# Patient Record
Sex: FEMALE | Race: WHITE | NOT HISPANIC OR LATINO | Employment: OTHER | URBAN - METROPOLITAN AREA
[De-identification: names, ages, dates, MRNs, and addresses within clinical notes are randomized per-mention and may not be internally consistent; named-entity substitution may affect disease eponyms.]

---

## 2017-02-14 ENCOUNTER — COMMUNICATION - HEALTHEAST (OUTPATIENT)
Dept: INTERNAL MEDICINE | Facility: CLINIC | Age: 67
End: 2017-02-14

## 2017-02-14 DIAGNOSIS — E78.5 HYPERLIPIDEMIA: ICD-10-CM

## 2017-03-22 ENCOUNTER — COMMUNICATION - HEALTHEAST (OUTPATIENT)
Dept: INTERNAL MEDICINE | Facility: CLINIC | Age: 67
End: 2017-03-22

## 2017-03-22 DIAGNOSIS — F41.9 ANXIETY: ICD-10-CM

## 2017-05-05 ENCOUNTER — OFFICE VISIT - HEALTHEAST (OUTPATIENT)
Dept: INTERNAL MEDICINE | Facility: CLINIC | Age: 67
End: 2017-05-05

## 2017-05-05 DIAGNOSIS — L98.9 SKIN DISORDER: ICD-10-CM

## 2017-05-05 DIAGNOSIS — Z00.00 HEALTH CARE MAINTENANCE: ICD-10-CM

## 2017-05-05 DIAGNOSIS — Z12.31 OTHER SCREENING MAMMOGRAM: ICD-10-CM

## 2017-05-05 DIAGNOSIS — E78.5 HYPERLIPEMIA: ICD-10-CM

## 2017-05-05 ASSESSMENT — MIFFLIN-ST. JEOR: SCORE: 1176.34

## 2017-05-23 ENCOUNTER — COMMUNICATION - HEALTHEAST (OUTPATIENT)
Dept: INTERNAL MEDICINE | Facility: CLINIC | Age: 67
End: 2017-05-23

## 2017-05-23 DIAGNOSIS — E78.5 HYPERLIPIDEMIA: ICD-10-CM

## 2017-05-24 ENCOUNTER — COMMUNICATION - HEALTHEAST (OUTPATIENT)
Dept: ADMINISTRATIVE | Facility: CLINIC | Age: 67
End: 2017-05-24

## 2017-06-02 ENCOUNTER — AMBULATORY - HEALTHEAST (OUTPATIENT)
Dept: LAB | Facility: CLINIC | Age: 67
End: 2017-06-02

## 2017-06-02 ENCOUNTER — HOSPITAL ENCOUNTER (OUTPATIENT)
Dept: MAMMOGRAPHY | Facility: CLINIC | Age: 67
Discharge: HOME | End: 2017-06-02
Attending: INTERNAL MEDICINE

## 2017-06-02 DIAGNOSIS — Z12.31 OTHER SCREENING MAMMOGRAM: ICD-10-CM

## 2017-06-02 DIAGNOSIS — E78.5 HYPERLIPEMIA: ICD-10-CM

## 2017-06-02 DIAGNOSIS — Z00.00 HEALTH CARE MAINTENANCE: ICD-10-CM

## 2017-06-02 LAB
CHOLEST SERPL-MCNC: 235 MG/DL
FASTING STATUS PATIENT QL REPORTED: YES
HDLC SERPL-MCNC: 98 MG/DL
LDLC SERPL CALC-MCNC: 122 MG/DL
TRIGL SERPL-MCNC: 74 MG/DL

## 2017-08-10 ENCOUNTER — COMMUNICATION - HEALTHEAST (OUTPATIENT)
Dept: INTERNAL MEDICINE | Facility: CLINIC | Age: 67
End: 2017-08-10

## 2017-08-10 DIAGNOSIS — F41.9 ANXIETY: ICD-10-CM

## 2018-05-04 ENCOUNTER — COMMUNICATION - HEALTHEAST (OUTPATIENT)
Dept: INTERNAL MEDICINE | Facility: CLINIC | Age: 68
End: 2018-05-04

## 2018-05-04 DIAGNOSIS — F41.9 ANXIETY: ICD-10-CM

## 2018-05-18 ENCOUNTER — COMMUNICATION - HEALTHEAST (OUTPATIENT)
Dept: INTERNAL MEDICINE | Facility: CLINIC | Age: 68
End: 2018-05-18

## 2018-05-18 DIAGNOSIS — E78.5 HYPERLIPIDEMIA: ICD-10-CM

## 2018-09-04 ENCOUNTER — OFFICE VISIT - HEALTHEAST (OUTPATIENT)
Dept: FAMILY MEDICINE | Facility: CLINIC | Age: 68
End: 2018-09-04

## 2018-09-04 DIAGNOSIS — S61.211A LACERATION OF LEFT INDEX FINGER WITHOUT FOREIGN BODY WITHOUT DAMAGE TO NAIL, INITIAL ENCOUNTER: ICD-10-CM

## 2018-09-05 ENCOUNTER — OFFICE VISIT - HEALTHEAST (OUTPATIENT)
Dept: FAMILY MEDICINE | Facility: CLINIC | Age: 68
End: 2018-09-05

## 2018-09-05 DIAGNOSIS — R35.0 URINARY FREQUENCY: ICD-10-CM

## 2018-09-05 DIAGNOSIS — N39.0 URINARY TRACT INFECTION: ICD-10-CM

## 2018-09-05 LAB
ALBUMIN UR-MCNC: NEGATIVE MG/DL
APPEARANCE UR: ABNORMAL
BACTERIA #/AREA URNS HPF: ABNORMAL HPF
BILIRUB UR QL STRIP: NEGATIVE
COLOR UR AUTO: YELLOW
GLUCOSE UR STRIP-MCNC: NEGATIVE MG/DL
HGB UR QL STRIP: ABNORMAL
KETONES UR STRIP-MCNC: NEGATIVE MG/DL
LEUKOCYTE ESTERASE UR QL STRIP: ABNORMAL
NITRATE UR QL: POSITIVE
PH UR STRIP: 7 [PH] (ref 5–8)
RBC #/AREA URNS AUTO: ABNORMAL HPF
SP GR UR STRIP: 1.01 (ref 1–1.03)
SQUAMOUS #/AREA URNS AUTO: ABNORMAL LPF
UROBILINOGEN UR STRIP-ACNC: ABNORMAL
WBC #/AREA URNS AUTO: ABNORMAL HPF

## 2018-09-07 LAB — BACTERIA SPEC CULT: ABNORMAL

## 2018-09-11 ENCOUNTER — OFFICE VISIT - HEALTHEAST (OUTPATIENT)
Dept: INTERNAL MEDICINE | Facility: CLINIC | Age: 68
End: 2018-09-11

## 2018-09-11 DIAGNOSIS — Z48.02 VISIT FOR SUTURE REMOVAL: ICD-10-CM

## 2018-09-14 ENCOUNTER — OFFICE VISIT - HEALTHEAST (OUTPATIENT)
Dept: INTERNAL MEDICINE | Facility: CLINIC | Age: 68
End: 2018-09-14

## 2018-09-14 DIAGNOSIS — Z48.02 ENCOUNTER FOR REMOVAL OF SUTURES: ICD-10-CM

## 2018-09-14 DIAGNOSIS — M35.00 SICCA SYNDROME (H): ICD-10-CM

## 2018-11-07 ENCOUNTER — COMMUNICATION - HEALTHEAST (OUTPATIENT)
Dept: INTERNAL MEDICINE | Facility: CLINIC | Age: 68
End: 2018-11-07

## 2018-11-07 DIAGNOSIS — F41.9 ANXIETY: ICD-10-CM

## 2018-11-13 ENCOUNTER — OFFICE VISIT - HEALTHEAST (OUTPATIENT)
Dept: INTERNAL MEDICINE | Facility: CLINIC | Age: 68
End: 2018-11-13

## 2018-11-13 DIAGNOSIS — Z00.00 ROUTINE HEALTH MAINTENANCE: ICD-10-CM

## 2018-11-13 DIAGNOSIS — Z00.00 ROUTINE GENERAL MEDICAL EXAMINATION AT A HEALTH CARE FACILITY: ICD-10-CM

## 2018-11-13 DIAGNOSIS — F41.9 ANXIETY: ICD-10-CM

## 2018-11-13 DIAGNOSIS — M94.9 DISORDER OF BONE AND CARTILAGE: ICD-10-CM

## 2018-11-13 DIAGNOSIS — M89.9 DISORDER OF BONE AND CARTILAGE: ICD-10-CM

## 2018-11-13 DIAGNOSIS — H90.6 MIXED HEARING LOSS, BILATERAL: ICD-10-CM

## 2018-11-13 DIAGNOSIS — Z12.31 ENCOUNTER FOR SCREENING MAMMOGRAM FOR MALIGNANT NEOPLASM OF BREAST: ICD-10-CM

## 2018-11-13 DIAGNOSIS — M35.00 SICCA SYNDROME (H): ICD-10-CM

## 2018-11-13 DIAGNOSIS — E78.5 HYPERLIPEMIA: ICD-10-CM

## 2018-11-13 ASSESSMENT — MIFFLIN-ST. JEOR: SCORE: 1195.16

## 2018-11-28 ENCOUNTER — AMBULATORY - HEALTHEAST (OUTPATIENT)
Dept: LAB | Facility: CLINIC | Age: 68
End: 2018-11-28

## 2018-11-28 DIAGNOSIS — M89.9 DISORDER OF BONE AND CARTILAGE: ICD-10-CM

## 2018-11-28 DIAGNOSIS — M94.9 DISORDER OF BONE AND CARTILAGE: ICD-10-CM

## 2018-11-28 DIAGNOSIS — Z00.00 ROUTINE HEALTH MAINTENANCE: ICD-10-CM

## 2018-11-28 DIAGNOSIS — E78.5 HYPERLIPEMIA: ICD-10-CM

## 2018-11-28 DIAGNOSIS — R82.90 ABNORMAL URINE: ICD-10-CM

## 2018-11-28 LAB
ALBUMIN SERPL-MCNC: 3.9 G/DL (ref 3.5–5)
ALBUMIN UR-MCNC: NEGATIVE MG/DL
ALP SERPL-CCNC: 97 U/L (ref 45–120)
ALT SERPL W P-5'-P-CCNC: 14 U/L (ref 0–45)
ANION GAP SERPL CALCULATED.3IONS-SCNC: 10 MMOL/L (ref 5–18)
APPEARANCE UR: CLEAR
AST SERPL W P-5'-P-CCNC: 18 U/L (ref 0–40)
BACTERIA #/AREA URNS HPF: ABNORMAL HPF
BILIRUB SERPL-MCNC: 0.7 MG/DL (ref 0–1)
BILIRUB UR QL STRIP: NEGATIVE
BUN SERPL-MCNC: 13 MG/DL (ref 8–22)
CALCIUM SERPL-MCNC: 9.9 MG/DL (ref 8.5–10.5)
CHLORIDE BLD-SCNC: 104 MMOL/L (ref 98–107)
CHOLEST SERPL-MCNC: 254 MG/DL
CO2 SERPL-SCNC: 27 MMOL/L (ref 22–31)
COLOR UR AUTO: YELLOW
CREAT SERPL-MCNC: 0.83 MG/DL (ref 0.6–1.1)
ERYTHROCYTE [DISTWIDTH] IN BLOOD BY AUTOMATED COUNT: 12.3 % (ref 11–14.5)
FASTING STATUS PATIENT QL REPORTED: YES
GFR SERPL CREATININE-BSD FRML MDRD: >60 ML/MIN/1.73M2
GLUCOSE BLD-MCNC: 100 MG/DL (ref 70–125)
GLUCOSE UR STRIP-MCNC: NEGATIVE MG/DL
HCT VFR BLD AUTO: 39.2 % (ref 35–47)
HDLC SERPL-MCNC: 93 MG/DL
HGB BLD-MCNC: 13.2 G/DL (ref 12–16)
HGB UR QL STRIP: ABNORMAL
KETONES UR STRIP-MCNC: NEGATIVE MG/DL
LDLC SERPL CALC-MCNC: 137 MG/DL
LEUKOCYTE ESTERASE UR QL STRIP: ABNORMAL
MCH RBC QN AUTO: 30.6 PG (ref 27–34)
MCHC RBC AUTO-ENTMCNC: 33.7 G/DL (ref 32–36)
MCV RBC AUTO: 91 FL (ref 80–100)
NITRATE UR QL: NEGATIVE
PH UR STRIP: 7 [PH] (ref 5–8)
PLATELET # BLD AUTO: 203 THOU/UL (ref 140–440)
PMV BLD AUTO: 7.2 FL (ref 7–10)
POTASSIUM BLD-SCNC: 3.9 MMOL/L (ref 3.5–5)
PROT SERPL-MCNC: 6.9 G/DL (ref 6–8)
RBC # BLD AUTO: 4.31 MILL/UL (ref 3.8–5.4)
RBC #/AREA URNS AUTO: ABNORMAL HPF
SODIUM SERPL-SCNC: 141 MMOL/L (ref 136–145)
SP GR UR STRIP: 1.02 (ref 1–1.03)
SQUAMOUS #/AREA URNS AUTO: ABNORMAL LPF
TRIGL SERPL-MCNC: 119 MG/DL
UROBILINOGEN UR STRIP-ACNC: ABNORMAL
WBC #/AREA URNS AUTO: ABNORMAL HPF
WBC: 5 THOU/UL (ref 4–11)

## 2018-11-29 LAB
25(OH)D3 SERPL-MCNC: 31.7 NG/ML (ref 30–80)
25(OH)D3 SERPL-MCNC: 31.7 NG/ML (ref 30–80)
BACTERIA SPEC CULT: NO GROWTH

## 2018-12-03 ENCOUNTER — TRANSFERRED RECORDS (OUTPATIENT)
Dept: HEALTH INFORMATION MANAGEMENT | Facility: CLINIC | Age: 68
End: 2018-12-03

## 2018-12-04 ENCOUNTER — OFFICE VISIT - HEALTHEAST (OUTPATIENT)
Dept: AUDIOLOGY | Facility: CLINIC | Age: 68
End: 2018-12-04

## 2018-12-04 DIAGNOSIS — H61.23 BILATERAL IMPACTED CERUMEN: ICD-10-CM

## 2018-12-12 ENCOUNTER — OFFICE VISIT - HEALTHEAST (OUTPATIENT)
Dept: OTOLARYNGOLOGY | Facility: CLINIC | Age: 68
End: 2018-12-12

## 2018-12-12 DIAGNOSIS — H61.23 BILATERAL IMPACTED CERUMEN: ICD-10-CM

## 2019-02-06 ENCOUNTER — HOSPITAL ENCOUNTER (OUTPATIENT)
Dept: MAMMOGRAPHY | Facility: CLINIC | Age: 69
Discharge: HOME | End: 2019-02-06
Attending: INTERNAL MEDICINE

## 2019-02-06 DIAGNOSIS — Z00.00 ROUTINE HEALTH MAINTENANCE: ICD-10-CM

## 2019-02-06 DIAGNOSIS — Z12.31 ENCOUNTER FOR SCREENING MAMMOGRAM FOR MALIGNANT NEOPLASM OF BREAST: ICD-10-CM

## 2019-02-23 ENCOUNTER — COMMUNICATION - HEALTHEAST (OUTPATIENT)
Dept: INTERNAL MEDICINE | Facility: CLINIC | Age: 69
End: 2019-02-23

## 2019-02-23 DIAGNOSIS — E78.5 HYPERLIPIDEMIA: ICD-10-CM

## 2019-04-02 ENCOUNTER — OFFICE VISIT - HEALTHEAST (OUTPATIENT)
Dept: AUDIOLOGY | Facility: CLINIC | Age: 69
End: 2019-04-02

## 2019-04-02 DIAGNOSIS — H91.92 HEARING LOSS OF LEFT EAR, UNSPECIFIED HEARING LOSS TYPE: ICD-10-CM

## 2019-04-02 DIAGNOSIS — H93.12 TINNITUS OF LEFT EAR: ICD-10-CM

## 2019-04-24 ENCOUNTER — OFFICE VISIT - HEALTHEAST (OUTPATIENT)
Dept: FAMILY MEDICINE | Facility: CLINIC | Age: 69
End: 2019-04-24

## 2019-04-24 DIAGNOSIS — R82.90 CLOUDY URINE: ICD-10-CM

## 2019-04-24 DIAGNOSIS — N30.00 ACUTE CYSTITIS WITHOUT HEMATURIA: ICD-10-CM

## 2019-04-24 LAB
ALBUMIN UR-MCNC: NEGATIVE MG/DL
APPEARANCE UR: ABNORMAL
BACTERIA #/AREA URNS HPF: ABNORMAL HPF
BILIRUB UR QL STRIP: NEGATIVE
COLOR UR AUTO: YELLOW
GLUCOSE UR STRIP-MCNC: NEGATIVE MG/DL
HGB UR QL STRIP: ABNORMAL
KETONES UR STRIP-MCNC: NEGATIVE MG/DL
LEUKOCYTE ESTERASE UR QL STRIP: ABNORMAL
NITRATE UR QL: POSITIVE
PH UR STRIP: 8.5 [PH] (ref 5–8)
RBC #/AREA URNS AUTO: ABNORMAL HPF
SP GR UR STRIP: 1.01 (ref 1–1.03)
SQUAMOUS #/AREA URNS AUTO: ABNORMAL LPF
UROBILINOGEN UR STRIP-ACNC: ABNORMAL
WBC #/AREA URNS AUTO: ABNORMAL HPF

## 2019-04-26 LAB — BACTERIA SPEC CULT: ABNORMAL

## 2019-05-09 ENCOUNTER — COMMUNICATION - HEALTHEAST (OUTPATIENT)
Dept: INTERNAL MEDICINE | Facility: CLINIC | Age: 69
End: 2019-05-09

## 2019-05-09 DIAGNOSIS — R31.29 MICROSCOPIC HEMATURIA: ICD-10-CM

## 2019-05-20 ENCOUNTER — OFFICE VISIT - HEALTHEAST (OUTPATIENT)
Dept: FAMILY MEDICINE | Facility: CLINIC | Age: 69
End: 2019-05-20
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
## 2019-05-20 DIAGNOSIS — S30.861A TICK BITE OF ABDOMEN, INITIAL ENCOUNTER: ICD-10-CM

## 2019-06-03 ENCOUNTER — AMBULATORY - HEALTHEAST (OUTPATIENT)
Dept: LAB | Facility: CLINIC | Age: 69
End: 2019-06-03

## 2019-06-03 DIAGNOSIS — R31.29 MICROSCOPIC HEMATURIA: ICD-10-CM

## 2019-06-03 LAB
ALBUMIN UR-MCNC: NEGATIVE MG/DL
APPEARANCE UR: CLEAR
BACTERIA #/AREA URNS HPF: ABNORMAL HPF
BILIRUB UR QL STRIP: NEGATIVE
COLOR UR AUTO: YELLOW
GLUCOSE UR STRIP-MCNC: NEGATIVE MG/DL
HGB UR QL STRIP: ABNORMAL
KETONES UR STRIP-MCNC: NEGATIVE MG/DL
LEUKOCYTE ESTERASE UR QL STRIP: ABNORMAL
NITRATE UR QL: NEGATIVE
PH UR STRIP: 6 [PH] (ref 5–8)
RBC #/AREA URNS AUTO: ABNORMAL HPF
SP GR UR STRIP: <=1.005 (ref 1–1.03)
SQUAMOUS #/AREA URNS AUTO: ABNORMAL LPF
UROBILINOGEN UR STRIP-ACNC: ABNORMAL
WBC #/AREA URNS AUTO: ABNORMAL HPF

## 2019-06-04 ENCOUNTER — COMMUNICATION - HEALTHEAST (OUTPATIENT)
Dept: SCHEDULING | Facility: CLINIC | Age: 69
End: 2019-06-04

## 2019-06-04 ENCOUNTER — OFFICE VISIT - HEALTHEAST (OUTPATIENT)
Dept: FAMILY MEDICINE | Facility: CLINIC | Age: 69
End: 2019-06-04

## 2019-06-04 DIAGNOSIS — Z12.11 SCREEN FOR COLON CANCER: ICD-10-CM

## 2019-06-04 DIAGNOSIS — J06.9 VIRAL URI WITH COUGH: ICD-10-CM

## 2019-06-04 DIAGNOSIS — B97.89 OTHER VIRAL AGENTS AS THE CAUSE OF DISEASES CLASSIFIED ELSEWHERE: ICD-10-CM

## 2019-06-04 DIAGNOSIS — B30.9 ACUTE VIRAL CONJUNCTIVITIS OF BOTH EYES: ICD-10-CM

## 2019-06-05 LAB — BACTERIA SPEC CULT: ABNORMAL

## 2019-06-06 ENCOUNTER — COMMUNICATION - HEALTHEAST (OUTPATIENT)
Dept: INTERNAL MEDICINE | Facility: CLINIC | Age: 69
End: 2019-06-06

## 2019-06-06 DIAGNOSIS — N39.0 URINARY TRACT INFECTION WITHOUT HEMATURIA, SITE UNSPECIFIED: ICD-10-CM

## 2019-08-23 ENCOUNTER — RECORDS - HEALTHEAST (OUTPATIENT)
Dept: ADMINISTRATIVE | Facility: OTHER | Age: 69
End: 2019-08-23

## 2019-11-04 ENCOUNTER — COMMUNICATION - HEALTHEAST (OUTPATIENT)
Dept: INTERNAL MEDICINE | Facility: CLINIC | Age: 69
End: 2019-11-04

## 2019-11-04 DIAGNOSIS — F41.9 ANXIETY: ICD-10-CM

## 2020-02-10 ENCOUNTER — HOSPITAL ENCOUNTER (OUTPATIENT)
Dept: MAMMOGRAPHY | Facility: CLINIC | Age: 70
Discharge: HOME | End: 2020-02-10
Attending: INTERNAL MEDICINE

## 2020-02-10 DIAGNOSIS — Z12.31 SCREENING MAMMOGRAM, ENCOUNTER FOR: ICD-10-CM

## 2020-02-13 ENCOUNTER — HOSPITAL ENCOUNTER (OUTPATIENT)
Dept: MAMMOGRAPHY | Facility: CLINIC | Age: 70
Discharge: HOME | End: 2020-02-13
Attending: INTERNAL MEDICINE

## 2020-02-13 DIAGNOSIS — N64.89 BREAST ASYMMETRY: ICD-10-CM

## 2020-04-07 ENCOUNTER — COMMUNICATION - HEALTHEAST (OUTPATIENT)
Dept: INTERNAL MEDICINE | Facility: CLINIC | Age: 70
End: 2020-04-07

## 2020-04-07 DIAGNOSIS — E78.5 HYPERLIPIDEMIA: ICD-10-CM

## 2020-04-20 ENCOUNTER — COMMUNICATION - HEALTHEAST (OUTPATIENT)
Dept: INTERNAL MEDICINE | Facility: CLINIC | Age: 70
End: 2020-04-20

## 2020-04-21 ENCOUNTER — COMMUNICATION - HEALTHEAST (OUTPATIENT)
Dept: INTERNAL MEDICINE | Facility: CLINIC | Age: 70
End: 2020-04-21

## 2020-04-22 ENCOUNTER — OFFICE VISIT - HEALTHEAST (OUTPATIENT)
Dept: INTERNAL MEDICINE | Facility: CLINIC | Age: 70
End: 2020-04-22

## 2020-04-22 DIAGNOSIS — M79.621 AXILLARY PAIN, RIGHT: ICD-10-CM

## 2020-04-22 DIAGNOSIS — H81.10 BENIGN PAROXYSMAL POSITIONAL VERTIGO, UNSPECIFIED LATERALITY: ICD-10-CM

## 2020-04-22 DIAGNOSIS — E78.5 HYPERLIPIDEMIA, UNSPECIFIED HYPERLIPIDEMIA TYPE: ICD-10-CM

## 2020-04-22 DIAGNOSIS — F41.9 ANXIETY: ICD-10-CM

## 2020-07-02 ENCOUNTER — COMMUNICATION - HEALTHEAST (OUTPATIENT)
Dept: INTERNAL MEDICINE | Facility: CLINIC | Age: 70
End: 2020-07-02

## 2020-07-02 DIAGNOSIS — E78.5 HYPERLIPIDEMIA: ICD-10-CM

## 2020-07-02 DIAGNOSIS — F41.9 ANXIETY: ICD-10-CM

## 2020-10-14 ENCOUNTER — OFFICE VISIT - HEALTHEAST (OUTPATIENT)
Dept: INTERNAL MEDICINE | Facility: CLINIC | Age: 70
End: 2020-10-14

## 2020-10-14 ENCOUNTER — TRANSFERRED RECORDS (OUTPATIENT)
Dept: HEALTH INFORMATION MANAGEMENT | Facility: CLINIC | Age: 70
End: 2020-10-14

## 2020-10-14 DIAGNOSIS — R05.3 CHRONIC COUGH: ICD-10-CM

## 2020-10-14 DIAGNOSIS — Z00.00 ROUTINE GENERAL MEDICAL EXAMINATION AT A HEALTH CARE FACILITY: ICD-10-CM

## 2020-10-14 DIAGNOSIS — M94.9 DISORDER OF BONE AND CARTILAGE: ICD-10-CM

## 2020-10-14 DIAGNOSIS — M35.00 SICCA SYNDROME (H): ICD-10-CM

## 2020-10-14 DIAGNOSIS — R79.89 LOW TSH LEVEL: ICD-10-CM

## 2020-10-14 DIAGNOSIS — F41.9 ANXIETY: ICD-10-CM

## 2020-10-14 DIAGNOSIS — M79.621 AXILLARY PAIN, RIGHT: ICD-10-CM

## 2020-10-14 DIAGNOSIS — M89.9 DISORDER OF BONE AND CARTILAGE: ICD-10-CM

## 2020-10-14 DIAGNOSIS — E78.5 HYPERLIPIDEMIA, UNSPECIFIED HYPERLIPIDEMIA TYPE: ICD-10-CM

## 2020-10-14 LAB
ALBUMIN SERPL-MCNC: 4.1 G/DL (ref 3.5–5)
ALBUMIN UR-MCNC: NEGATIVE MG/DL
ALP SERPL-CCNC: 132 U/L (ref 45–120)
ALT SERPL W P-5'-P-CCNC: 19 U/L (ref 0–45)
ANION GAP SERPL CALCULATED.3IONS-SCNC: 10 MMOL/L (ref 5–18)
APPEARANCE UR: CLEAR
AST SERPL W P-5'-P-CCNC: 18 U/L (ref 0–40)
BACTERIA #/AREA URNS HPF: ABNORMAL HPF
BILIRUB SERPL-MCNC: 0.6 MG/DL (ref 0–1)
BILIRUB UR QL STRIP: NEGATIVE
BUN SERPL-MCNC: 14 MG/DL (ref 8–22)
CALCIUM SERPL-MCNC: 10 MG/DL (ref 8.5–10.5)
CHLORIDE BLD-SCNC: 104 MMOL/L (ref 98–107)
CHOLEST SERPL-MCNC: 217 MG/DL
CO2 SERPL-SCNC: 27 MMOL/L (ref 22–31)
COLOR UR AUTO: YELLOW
CREAT SERPL-MCNC: 0.75 MG/DL (ref 0.6–1.1)
ERYTHROCYTE [DISTWIDTH] IN BLOOD BY AUTOMATED COUNT: 14 % (ref 11–14.5)
FASTING STATUS PATIENT QL REPORTED: YES
GFR SERPL CREATININE-BSD FRML MDRD: >60 ML/MIN/1.73M2
GLUCOSE BLD-MCNC: 98 MG/DL (ref 70–125)
GLUCOSE UR STRIP-MCNC: NEGATIVE MG/DL
HCT VFR BLD AUTO: 41.6 % (ref 35–47)
HDLC SERPL-MCNC: 75 MG/DL
HGB BLD-MCNC: 13.8 G/DL (ref 12–16)
HGB UR QL STRIP: ABNORMAL
KETONES UR STRIP-MCNC: NEGATIVE MG/DL
LDLC SERPL CALC-MCNC: 112 MG/DL
LEUKOCYTE ESTERASE UR QL STRIP: NEGATIVE
MCH RBC QN AUTO: 29.1 PG (ref 27–34)
MCHC RBC AUTO-ENTMCNC: 33 G/DL (ref 32–36)
MCV RBC AUTO: 88 FL (ref 80–100)
MUCOUS THREADS #/AREA URNS LPF: ABNORMAL LPF
NITRATE UR QL: NEGATIVE
PH UR STRIP: 7 [PH] (ref 5–8)
PLATELET # BLD AUTO: 212 THOU/UL (ref 140–440)
PMV BLD AUTO: 7.8 FL (ref 7–10)
POTASSIUM BLD-SCNC: 5.1 MMOL/L (ref 3.5–5)
PROT SERPL-MCNC: 7.2 G/DL (ref 6–8)
RBC # BLD AUTO: 4.72 MILL/UL (ref 3.8–5.4)
RBC #/AREA URNS AUTO: ABNORMAL HPF
SODIUM SERPL-SCNC: 141 MMOL/L (ref 136–145)
SP GR UR STRIP: 1.01 (ref 1–1.03)
SQUAMOUS #/AREA URNS AUTO: ABNORMAL LPF
T3 SERPL-MCNC: 126 NG/DL (ref 45–175)
T4 FREE SERPL-MCNC: 1.1 NG/DL (ref 0.7–1.8)
TRIGL SERPL-MCNC: 148 MG/DL
TSH SERPL DL<=0.005 MIU/L-ACNC: <0.01 UIU/ML (ref 0.3–5)
UROBILINOGEN UR STRIP-ACNC: ABNORMAL
WBC #/AREA URNS AUTO: ABNORMAL HPF
WBC: 5.1 THOU/UL (ref 4–11)

## 2020-10-14 ASSESSMENT — MIFFLIN-ST. JEOR: SCORE: 1194.48

## 2020-10-15 ENCOUNTER — AMBULATORY - HEALTHEAST (OUTPATIENT)
Dept: INTERNAL MEDICINE | Facility: CLINIC | Age: 70
End: 2020-10-15

## 2020-10-15 DIAGNOSIS — E05.90 SUBCLINICAL HYPERTHYROIDISM: ICD-10-CM

## 2020-10-15 LAB
25(OH)D3 SERPL-MCNC: 34.5 NG/ML (ref 30–80)
25(OH)D3 SERPL-MCNC: 34.5 NG/ML (ref 30–80)
THYROID PEROXIDASE ANTIBODIES - HISTORICAL: 470.7 IU/ML (ref 0–5.6)

## 2020-10-16 LAB — THYROGLOB AB SERPL-ACNC: 42.7 IU/ML (ref 0–4)

## 2020-10-18 ENCOUNTER — COMMUNICATION - HEALTHEAST (OUTPATIENT)
Dept: INTERNAL MEDICINE | Facility: CLINIC | Age: 70
End: 2020-10-18

## 2020-10-18 ENCOUNTER — AMBULATORY - HEALTHEAST (OUTPATIENT)
Dept: INTERNAL MEDICINE | Facility: CLINIC | Age: 70
End: 2020-10-18

## 2020-10-18 DIAGNOSIS — E05.90 SUBCLINICAL HYPERTHYROIDISM: ICD-10-CM

## 2020-10-19 ENCOUNTER — TRANSFERRED RECORDS (OUTPATIENT)
Dept: HEALTH INFORMATION MANAGEMENT | Facility: CLINIC | Age: 70
End: 2020-10-19

## 2020-10-19 ENCOUNTER — COMMUNICATION - HEALTHEAST (OUTPATIENT)
Dept: ADMINISTRATIVE | Facility: CLINIC | Age: 70
End: 2020-10-19

## 2020-10-19 ENCOUNTER — HOSPITAL ENCOUNTER (OUTPATIENT)
Dept: NUCLEAR MEDICINE | Facility: HOSPITAL | Age: 70
Discharge: HOME | End: 2020-10-19
Attending: INTERNAL MEDICINE

## 2020-10-19 DIAGNOSIS — E05.90 SUBCLINICAL HYPERTHYROIDISM: ICD-10-CM

## 2020-10-19 LAB — TSH RECEP AB SER-ACNC: 1.53 IU/L

## 2020-10-20 ENCOUNTER — COMMUNICATION - HEALTHEAST (OUTPATIENT)
Dept: INTERNAL MEDICINE | Facility: CLINIC | Age: 70
End: 2020-10-20

## 2020-10-20 ENCOUNTER — HOSPITAL ENCOUNTER (OUTPATIENT)
Dept: NUCLEAR MEDICINE | Facility: HOSPITAL | Age: 70
Discharge: HOME | End: 2020-10-20
Attending: INTERNAL MEDICINE

## 2020-10-20 DIAGNOSIS — M35.00 SICCA SYNDROME (H): ICD-10-CM

## 2020-10-20 DIAGNOSIS — R79.89 LOW TSH LEVEL: ICD-10-CM

## 2020-10-27 NOTE — TELEPHONE ENCOUNTER
RECORDS RECEIVED FROM:    DATE RECEIVED: 11.4.20    NOTES (FOR ALL VISITS) STATUS DETAILS   OFFICE NOTES from referring provider [facility]-  Dr. Henderson    OFFICE NOTES from other specialist na    ED NOTES na    OPERATIVE REPORT  (thyroid, pituitary, adrenal, parathyroid) na    MEDICATION LIST NA    IMAGING      DEXASCAN ce HE- 12.4.18, 4.9.16,    MRI (BRAIN) na    XR (Chest) ce HE- 10.14.20, 6.4.19,    CT (HEAD/NECK/CHEST/ABDOMEN) na    NUCLEAR  na    ULTRASOUND (HEAD/NECK) ce HE- 10.20.20   LABS     DIABETES: HBGA1C, CREATININE, FASTING LIPIDS, MICROALBUMIN URINE, POTASSIUM, TSH, T4    THYROID: TSH, T4, CBC, THYRODLONULIN, TOTAL T3, FREE T4, CALCITONIN, CEA CE T3- 10.14.20   T4- 10.14.20  TSH 10.14.20          Action 10.27.20 sv   Action Taken Image request sent to [facility] for   dexa-  12.4.18, 4.9.1-6-- received into PACS---   CXR- 10.14.20, 6.4.19--11.2.20---- 2nd request sent----   US thyroid- 10.20.20 -- received into PACS----

## 2020-11-02 ENCOUNTER — HOSPITAL ENCOUNTER (OUTPATIENT)
Dept: ULTRASOUND IMAGING | Facility: CLINIC | Age: 70
Discharge: HOME | End: 2020-11-02
Attending: INTERNAL MEDICINE

## 2020-11-02 DIAGNOSIS — E05.90 SUBCLINICAL HYPERTHYROIDISM: ICD-10-CM

## 2020-11-03 RX ORDER — ACETAMINOPHEN 325 MG/1
650 TABLET ORAL
COMMUNITY

## 2020-11-03 RX ORDER — SIMVASTATIN 20 MG
20 TABLET ORAL
COMMUNITY
Start: 2020-07-03 | End: 2021-12-10

## 2020-11-03 RX ORDER — IBUPROFEN 200 MG
200 TABLET ORAL
COMMUNITY

## 2020-11-03 RX ORDER — PAROXETINE 20 MG/1
20 TABLET, FILM COATED ORAL
COMMUNITY
Start: 2020-07-03 | End: 2021-12-10

## 2020-11-03 NOTE — PROGRESS NOTES
"Ivon Newell is a 70 year old female who is being evaluated via a billable video visit.      The patient has been notified of following:     \"This video visit will be conducted via a call between you and your physician/provider. We have found that certain health care needs can be provided without the need for an in-person physical exam.  This service lets us provide the care you need with a video conversation.  If a prescription is necessary we can send it directly to your pharmacy.  If lab work is needed we can place an order for that and you can then stop by our lab to have the test done at a later time.    Video visits are billed at different rates depending on your insurance coverage.  Please reach out to your insurance provider with any questions.    If during the course of the call the physician/provider feels a video visit is not appropriate, you will not be charged for this service.\"    Patient has given verbal consent for Video visit? Yes  How would you like to obtain your AVS? MyChart  Will anyone else be joining your video visit? No    Video-Visit Details    Type of service:  Video Visit    Distant Location (provider location):  [facility] DARIELA Christie MA          "

## 2020-11-04 ENCOUNTER — VIRTUAL VISIT (OUTPATIENT)
Dept: ENDOCRINOLOGY | Facility: CLINIC | Age: 70
End: 2020-11-04
Payer: COMMERCIAL

## 2020-11-04 ENCOUNTER — RECORDS - HEALTHEAST (OUTPATIENT)
Dept: ADMINISTRATIVE | Facility: OTHER | Age: 70
End: 2020-11-04

## 2020-11-04 ENCOUNTER — PRE VISIT (OUTPATIENT)
Dept: ENDOCRINOLOGY | Facility: CLINIC | Age: 70
End: 2020-11-04

## 2020-11-04 DIAGNOSIS — R00.2 PALPITATIONS: ICD-10-CM

## 2020-11-04 DIAGNOSIS — E05.90 SUBCLINICAL HYPERTHYROIDISM: Primary | ICD-10-CM

## 2020-11-04 DIAGNOSIS — R25.1 TREMOR: ICD-10-CM

## 2020-11-04 PROCEDURE — 99244 OFF/OP CNSLTJ NEW/EST MOD 40: CPT | Mod: 95

## 2020-11-04 NOTE — PROGRESS NOTES
Endocrine Consult Video visit note-     Attending Assessment/Plan :     Hyperthyroidism , subclinical , new onset, associated with 10 lbs weight loss.  High TPO and CAROLINA but negative TRAB.  The 123I uptake suggests this is hyperthyroidism and not thyroiditis.  Yet, she has history of symptoms and ? Throat pain in April which raises questions of viral infection/ potential for thyroiditis.   Labs to include TSI, repeat TFTS again soon to check for changing levels  I recommend we start methimazole after repeating the labs to confirm she isn't improving or much worse-- I  have counseled her on Graves' treatment options including antithyroid drugs, radioactive iodine and thyroidectomy. I have given her an information sheet which outlines the advantages and disadvantages of each option and I have reviewed the options.  I have given her an information sheet which specifically outlines the potential complications and rules for patients on antithyroid medication, including when to call, and our daytime and nighttime contact information.    11/6/2020 Addendum: review of late images to PACS from 11/2/2020 thyroid US:   Thyroid normal size, slightly heterogeneous, grade 3 blood flow on some images, grade 1 on image with CCA (presumably gain is turned way down)  Right inferior nodule 0.3 x 0.2 x 0.3 cm hypoechoic  Left nodule 0.4 x 0.2 c 0.3     11/18/2020 patient alerted us to the [facility] labs that had not been transmitted  = from 11/11/2020 TSI 2.93, TSH < 0.01, free T4 1.1, T3 126    Tremor , palpitations and intermittent tachycardia are symptoms I would attribute to # 1.      Sjogren syndrome in Ivon.  Family history of autoimune diseases    Low bone density - not addressed    Post menopausal -    Due to the COVID 19 pandemic this visit was a video visit in order to help prevent spread of infection in this high risk patient and the general population. The patient gave verbal consent for the visit today.    Start time 1603-  amwel -we can see each other but she can't hear me.  I can hear her intermittently. She signed out and signed back in   Stop time 1627  Total time 24 minutes     Christiane Montero MD    Chief complaint:  Ivon is a 70 year old female seen in consultation at the request of Dr Errol Henderson for subclinical hyperthyroidism. I have reviewed Care Everywhere including [facility]  lab reports, imaging reports and provider notes as indicated.  The US images are not on PACS.     HISTORY OF PRESENT ILLNESS    The records shows the following visits  6/4/19 viral URI with cough  4/22/2020 benign positional vertigo. Today she states that it gradually resolved. She recalls this started after a few days of not feelngg well. She had axillary pain at the same time.    10/14/2020 annual exam - diagnosis included anxieyt resulting in check of TFTS . Thyroid exam was performed and described as not enlarged and with no tenderness.  Her HR was 86/minute, /72 and weight 147    9/25/09 TSH 2.24  1/9/15 TSH 2.43  1/23/15 RF 35.8 (H), DOMINIQUE 6.7 (H)< SSA /Ro 183 (H)< SSB/La 37 (H)  1/15/16 TSH 2.3  10/14/2020 TSH < 0.01, free T4 1.1, T3 126, CAROLINA 42.7, .7, TRAB 1.53 25OHD 34.5, Ca 10    Weights  10/14/2020 147 #   4/22/2020 158  5/20/19 156    4/9/16 DXA lowest T-score -2.2 right femoral neck  12/4/18 DXA lowest T-score -1.7 right femoral neck ; FRAX 14.6/1.5%  10/20/2020 [facility] 28.3-- I have reviewed the imges on pACS - they show diffuse uptake  Left > right .    11[facility]2/2020 [facility]-- the images for this study were not pushed to PACS and cannot be viewed by me today    REVIEW OF SYSTEMS  Doesn't feel bad  Weight loss 11 lbs over the summer attributed to watching diet and heavy gardening  Sleep at night is OK  Sweats very easily x all summer ; could sweat through 3 headbands  Can take a 3 hour nap  Eyes: eyes are dry - attributed to Sjogrens.    Feels deep down sore  throat  Intermittent during the summer.    Cardiac: irregular heartbeats- usually after supper; has lasted up 3 minutes, irregular up to 130/minute; no palpitations in bed at night  Respiratory: chronic cough; no wheezing  GI: BM x 0-3 ; chronically irregular ; BM a little harder ; she has not been tested for celiac.  No pain  No headaches  Tiny vertio a few weeks ago  Ridged fingernails  10 system ROS otherwise as per the HPI or negative    Past Medical History  Past Medical History:   Diagnosis Date     Anxiety      Chronic cough      Hyperlipidemia      Low bone density      Sjogren's syndrome (H)      Past Surgical History:   Procedure Laterality Date      SECTION      x 2     ELBOW SURGERY       HYSTERECTOMY       Medications  Current Outpatient Medications   Medication Sig Dispense Refill     acetaminophen (TYLENOL) 325 MG tablet Take 650 mg by mouth       aspirin (ASA) 81 MG EC tablet Take 81 mg by mouth       calcium carbonate 600 mg-vitamin D 400 units (CALTRATE) 600-400 MG-UNIT per tablet Take 1 tablet by mouth 2 times daily       ibuprofen (ADVIL/MOTRIN) 200 MG tablet Take 200 mg by mouth       Multiple Vitamins-Minerals (MULTIVITAMIN ADULT PO)        PARoxetine (PAXIL) 20 MG tablet Take 20 mg by mouth       simvastatin (ZOCOR) 20 MG tablet Take 20 mg by mouth       paxil has been x 4 years   150 mcg biotin in her MVI    Allergies  Allergies   Allergen Reactions     Bacitracin Itching and Other (See Comments)     hoarseness       Sulfa Drugs      Family History  family history includes Celiac Disease in her daughter; Diabetes (age of onset: 90) in her father; Goiter in her daughter; Rheumatoid Arthritis in her father; Thyroid nodules in her sister.    Social History  Social History     Tobacco Use     Smoking status: None   Substance Use Topics     Alcohol use: None     Drug use: None       Physical Exam  There were no vitals taken for this visit.  There is no height or weight on file to calculate  BMI.  GENERAL:pleasant woman in  no distress  BP Readings from Last 1 Encounters:   No data found for BP      Pulse Readings from Last 1 Encounters:   No data found for Pulse      Resp Readings from Last 1 Encounters:   No data found for Resp      Temp Readings from Last 1 Encounters:   No data found for Temp      SpO2 Readings from Last 1 Encounters:   No data found for SpO2      Wt Readings from Last 1 Encounters:   No data found for Wt      Ht Readings from Last 1 Encounters:   No data found for Ht     SKIN: Visible skin clear. No significant rash, abnormal pigmentation or lesions.  NECK: she is wearing a turtle neck top so I cannot see the thyroid.   EYES: Eyes grossly normal to inspection.  No discharge or erythema, or obvious scleral/conjunctival abnormalities.  RESP: No audible wheeze, cough, or visible cyanosis.  No visible retractions or increased work of breathing.    NEURO: Cranial nerves grossly intact.  Awake, alert, responds appropriately to questions.  Mentation and speech fluent. + fine tremor of the outstretched hand.   PSYCH:affect normal, judgement and insight intact, and appearance well-groomed.    DATA REVIEW      EXAM: NM THYROID UPTAKE AND SCAN  LOCATION: [facility]  DATE/TIME: 10/20/2020 8:36 AM    INDICATION: Clinical and/or laboratory evidence of hyperthyroidism.  COMPARISON: None available at time of dictation.  TECHNIQUE: 242 uCi I-123, oral ingestion. 3-hour & 24-hour neck uptake and imaging.    FINDINGS:   3-hour uptake: 12.5% (Normal range: 4-10%).  24-hour uptake: 28.3% (Normal range: 10-30%).    Diffuse radiotracer uptake throughout the thyroid gland suspicious for Graves' disease without hot/cold nodule.   Other Result Information   Interface, Rad Results In - 10/20/2020  8:43 AM CDT  EXAM: NM THYROID UPTAKE AND SCAN  LOCATION: [facility]  DATE/TIME: 10/20/2020 8:36 AM    INDICATION: Clinical and/or laboratory evidence of  hyperthyroidism.  COMPARISON: None available at time of dictation.  TECHNIQUE: 242 uCi I-123, oral ingestion. 3-hour & 24-hour neck uptake and imaging.    FINDINGS:   3-hour uptake: 12.5% (Normal range: 4-10%).  24-hour uptake: 28.3% (Normal range: 10-30%).    Diffuse radiotracer uptake throughout the thyroid gland suspicious for Graves' disease without hot/cold nodule.     US Oddoczt36/2/2020  [facility]  Result Impression   1.  Normal size thyroid with bilateral heterogeneous echotexture.  2.  No significant nodule seen.      Nodules are characterized per  ACR Thyroid Imaging, Reporting and Data System (TI-RADS): White Paper of the ACR TI-RADS Committee  Misha Grewal et al. Journal of the American College of Radiology 2017. Volume 14 (2017), Issue 5, 452-998.    Other Result Information   This result has an attachment that is not available.   Result Narrative   EXAM: US THYROID  LOCATION: [facility]  DATE/TIME: 11/2/2020 9:19 AM    INDICATION: Thyrotoxicosis, unspecified without thyrotoxic crisis or storm  COMPARISON: None.  TECHNIQUE: Thyroid ultrasound.     FINDINGS:  RIGHT lobe: 3.7 x 1.5 x 1.4 cm. Heterogeneous without discrete nodule.  Isthmus: 2 mm.  LEFT lobe: 3.5 x 1.6 x 1.5 cm. Heterogeneous with a mid pole 4 x 3 x 2 mm hypoechoic nodule or complex cyst.    NECK: No cervical lymphadenopathy.    NODULES:    Nodule 1: Left mid pole 4 x 3 x 2 mm.   Composition: Mixed cystic and solid, 1 point   Echogenicity: Unable to determine, 1 point   Shape: Wider-than-tall, 0 points   Margin: Ill-defined, 0 points   Echogenic Foci: None, or large comet-tail artifacts, 0 points   Point Total: 1-2 points. TI-RADS 2. No FNA.

## 2020-11-04 NOTE — PATIENT INSTRUCTIONS
Repeat labs soon at [facility].  I would like to see the results before starting treatment  I will send results on [facility] - if you haven't heard from me in a few days after the draw , let me know.    Anticipate we will do labs approximately monthly for a while   See me in approximately 4 months    Information for patients on Tapazole or Propylthiouracil (PTU)  The generic name for Tapazole is methimazole.      The drugs, Tapazole and PTU are used to treat an overactive thyroid (hyperthyroidism).      They prevent the thyroid from making too much thyroid hormone.  You can think of them as putting up a road block in your thyroid.    These drugs are usually well tolerated and safe, but rarely can cause serious side effects.  The two worst potential side-effects are:  1. agranulocytosis.  This is the loss of the white blood cells that fight infection.  This can occur in approximately 1 in 200 people.  2. liver problems.  This is even more rare than agranulocytosis but it can be very serious.    Because of the serious nature of the rare side-effects, you should notify your doctor if you develop the following symptoms while taking the dru. Fever  2. sore throat  3. flu-like symptoms (nausea, vomiting, diarrhea, aches, abdominal pain)    In addition, anytime you have evidence of infection, you should get a blood test to be sure that you do not have agranulocytosis.  A prescription will be provided to you to get this blood test as needed.  This is an extra precaution and the results should be available the same day the blood test is drawn.  If your blood count is OK (which it almost always is), then you may continue to take the antithyroid drug.    While taking this medication, your doctor will probably want to see you frequently, every 1-2 months, at least for a time.  This is important so that the response can be monitored and the dose can be adjusted.      If you have concerns you may reach us at [phone] during  regular hours, and [phone] (ask for endocrinologist on call) after hours.    Options for Treatment of Hyperthyroidism in Graves  hyperthyroidism    There are 3 options for treating hyperthyroidism.  The treatment method that is best for you depends on several factors, including your age, general health status, pregnancy status, convenience and preferences.      The options for treatment are listed below with advantages and disadvantages of each:    1. Medicine.      This requires taking a pill one to 3 times / day.  You will require monthly follow-up with me during the time you are taking the pills.  The pills will control the ability of the thyroid to make too much thyroid hormone and you will gradually improve.      Advantages:  This is an easy and effective form of therapy.    Disadvantages:  This only controls the thyroid while you take the pills. With discontinuation of the pills, the hyperthyroidism will relapse probably within days.    Side-effects are very rare but can be serious, including agranulocytosis (or the loss of white blood cells that control infection).      The pills do not come in an intravenous form, which can make treatment very difficult if you are sick and unable to take oral medication.  The medication is not a life long option, but an acceptable short term treatment.      2. Radioactive iodine    Since the thyroid uses iodine to make thyroid hormone, administration of appropriate doses of radioactive iodine will specifically target and destroy the thyroid, thereby treating the over-activity.     Procedure:  Before the treatment, it is necessary that we determine the function of your thyroid gland by performing a thyroid uptake test in the radiology department at the hospital.  This is a 2-day procedure.  Day 1 involves oral administration of a very small dose of radioactive iodine.  On day 2 (24 hours after the dose was administered), you will lay under the camera, which will take a  picture of your thyroid.  This will give us information about your thyroid s function, which we need in order to calculate your treatment dose of radioactive iodine.     The treatment dose of radioactive iodine is delivered either later on the same day (day 2 of the uptake test), or at your earliest convenience.  The treatment is again an oral administration of radioactive iodine, but the dose is much higher than that used for the uptake test.  You will then gradually return to normal thyroid function over a period of weeks to months.      Advantages:  This is an easy and effective form of therapy.  Painless.      Disadvantages:  In many cases this treatment results in permanent hypothyroidism (thyroid under-activity) which will require thyroid hormone replacement pills for the rest of your life).      Women of reproductive age must be documented to not be pregnant before the treatment.  We also recommend that you not attempt pregnancy for 6 months after the treatment.    This treatment may increase the thyroid stimulating antibody levels and worsen the eye disease of Graves .     3. Surgical removal of the thyroid gland.      Surgical removal of the thyroid gland will quickly result in hypothyroidism (requirement for thyroid hormone replacement).  For your safety, it is important that your thyroid function is normal prior to the operation (controlled by the anti-thyroid medication) and also that you have an experienced thyroid surgeon perform the operation.    Advantages:  Effective for treatment of hyperthyroidism.      Disadvantages: Risk of damage to the recurrent laryngeal nerve (which can lead to hoarseness); risk of damage to the parathyroid glands (which can lead to low calcium).  Anesthesia risks.  General surgical risks of bleeding, infection.    .

## 2020-11-04 NOTE — LETTER
"11/4/2020       RE: Ivon Newell  [address] [place] [id number]     Dear Colleague,    Thank you for referring your patient, Ivon Newell, to the [facility] ENDOCRINOLOGY [facility] [place] at [facility]. Please see a copy of my visit note below.    Ivon Newell is a 70 year old female who is being evaluated via a billable video visit.      The patient has been notified of following:     \"This video visit will be conducted via a call between you and your physician/provider. We have found that certain health care needs can be provided without the need for an in-person physical exam.  This service lets us provide the care you need with a video conversation.  If a prescription is necessary we can send it directly to your pharmacy.  If lab work is needed we can place an order for that and you can then stop by our lab to have the test done at a later time.    Video visits are billed at different rates depending on your insurance coverage.  Please reach out to your insurance provider with any questions.    If during the course of the call the physician/provider feels a video visit is not appropriate, you will not be charged for this service.\"    Patient has given verbal consent for Video visit? Yes  How would you like to obtain your AVS? MyChart  Will anyone else be joining your video visit? No    Video-Visit Details    Type of service:  Video Visit    Distant Location (provider location):  [facility] ENDOCRINOLOGY [facility] [place]     Melani Christie MA            Endocrine Consult Video visit note-     Attending Assessment/Plan :     Hyperthyroidism , subclinical , new onset, associated with 10 lbs weight loss.  High TPO and CAROLINA but negative TRAB.  The 123I uptake suggests this is hyperthyroidism and not thyroiditis.  Yet, she has history of symptoms and ? Throat pain in April which raises questions of viral infection/ potential for thyroiditis.   Labs to " include TSI, repeat TFTS again soon to check for changing levels  I recommend we start methimazole after repeating the labs to confirm she isn't improving or much worse-- I  have counseled her on Graves' treatment options including antithyroid drugs, radioactive iodine and thyroidectomy. I have given her an information sheet which outlines the advantages and disadvantages of each option and I have reviewed the options.  I have given her an information sheet which specifically outlines the potential complications and rules for patients on antithyroid medication, including when to call, and our daytime and nighttime contact information.    11/6/2020 Addendum: review of late images to PACS from 11/2/2020 thyroid US:   Thyroid normal size, slightly heterogeneous, grade 3 blood flow on some images, grade 1 on image with CCA (presumably gain is turned way down)  Right inferior nodule 0.3 x 0.2 x 0.3 cm hypoechoic  Left nodule 0.4 x 0.2 c 0.3     Tremor , palpitations and intermittent tachycardia are symptoms I would attribute to # 1.      Sjogren syndrome in Ivon.  Family history of autoimune diseases    Low bone density - not addressed    Post menopausal -    Due to the COVID 19 pandemic this visit was a video visit in order to help prevent spread of infection in this high risk patient and the general population. The patient gave verbal consent for the visit today.    Start time 1603- amwel -we can see each other but she can't hear me.  I can hear her intermittently. She signed out and signed back in   Stop time 1627  Total time 24 minutes     Christiane Montero MD    Chief complaint:  Ivon is a 70 year old female seen in consultation at the request of Dr Errol Henderson for subclinical hyperthyroidism. I have reviewed Care Everywhere including [facility]  lab reports, imaging reports and provider notes as indicated.  The US images are not on PACS.     HISTORY OF PRESENT ILLNESS    The records shows the following  visits  6/4/19 viral URI with cough  4/22/2020 benign positional vertigo. Today she states that it gradually resolved. She recalls this started after a few days of not feelngg well. She had axillary pain at the same time.    10/14/2020 annual exam - diagnosis included anxieyt resulting in check of TFTS . Thyroid exam was performed and described as not enlarged and with no tenderness.  Her HR was 86/minute, /72 and weight 147    9/25/09 TSH 2.24  1/9/15 TSH 2.43  1/23/15 RF 35.8 (H), DOMINIQUE 6.7 (H)< SSA /Ro 183 (H)< SSB/La 37 (H)  1/15/16 TSH 2.3  10/14/2020 TSH < 0.01, free T4 1.1, T3 126, CAROLINA 42.7, .7, TRAB 1.53 25OHD 34.5, Ca 10    Weights  10/14/2020 147 #   4/22/2020 158  5/20/19 156    4/9/16 DXA lowest T-score -2.2 right femoral neck  12/4/18 DXA lowest T-score -1.7 right femoral neck ; FRAX 14.6/1.5%  10/20/2020 123I thyorid uptake/scan [facility] 24 hour uptake 28.3-- I have reviewed the imges on pACS - they show diffuse uptake  Left > right .    11/2/2020 thyroid [facility]-- the images for this study were not pushed to PACS and cannot be viewed by me today    REVIEW OF SYSTEMS  Doesn't feel bad  Weight loss 11 lbs over the summer attributed to watching diet and heavy gardening  Sleep at night is OK  Sweats very easily x all summer ; could sweat through 3 headbands  Can take a 3 hour nap  Eyes: eyes are dry - attributed to Sjogrens.    Feels deep down sore throat  Intermittent during the summer.    Cardiac: irregular heartbeats- usually after supper; has lasted up 3 minutes, irregular up to 130/minute; no palpitations in bed at night  Respiratory: chronic cough; no wheezing  GI: BM x 0-3 ; chronically irregular ; BM a little harder ; she has not been tested for celiac.  No pain  No headaches  Tiny vertio a few weeks ago  Ridged fingernails  10 system ROS otherwise as per the HPI or negative    Past Medical History  Past Medical History:   Diagnosis Date     Anxiety      Chronic cough       Hyperlipidemia      Low bone density      Sjogren's syndrome (H)      Past Surgical History:   Procedure Laterality Date      SECTION      x 2     ELBOW SURGERY       HYSTERECTOMY       Medications  Current Outpatient Medications   Medication Sig Dispense Refill     acetaminophen (TYLENOL) 325 MG tablet Take 650 mg by mouth       aspirin (ASA) 81 MG EC tablet Take 81 mg by mouth       calcium carbonate 600 mg-vitamin D 400 units (CALTRATE) 600-400 MG-UNIT per tablet Take 1 tablet by mouth 2 times daily       ibuprofen (ADVIL/MOTRIN) 200 MG tablet Take 200 mg by mouth       Multiple Vitamins-Minerals (MULTIVITAMIN ADULT PO)        PARoxetine (PAXIL) 20 MG tablet Take 20 mg by mouth       simvastatin (ZOCOR) 20 MG tablet Take 20 mg by mouth       paxil has been x 4 years   150 mcg biotin in her MVI    Allergies  Allergies   Allergen Reactions     Bacitracin Itching and Other (See Comments)     hoarseness       Sulfa Drugs      Family History  family history includes Celiac Disease in her daughter; Diabetes (age of onset: 90) in her father; Goiter in her daughter; Rheumatoid Arthritis in her father; Thyroid nodules in her sister.    Social History  Social History     Tobacco Use     Smoking status: None   Substance Use Topics     Alcohol use: None     Drug use: None       Physical Exam  There were no vitals taken for this visit.  There is no height or weight on file to calculate BMI.  GENERAL:pleasant woman in  no distress  BP Readings from Last 1 Encounters:   No data found for BP      Pulse Readings from Last 1 Encounters:   No data found for Pulse      Resp Readings from Last 1 Encounters:   No data found for Resp      Temp Readings from Last 1 Encounters:   No data found for Temp      SpO2 Readings from Last 1 Encounters:   No data found for SpO2      Wt Readings from Last 1 Encounters:   No data found for Wt      Ht Readings from Last 1 Encounters:   No data found for Ht     SKIN: Visible skin clear. No  significant rash, abnormal pigmentation or lesions.  NECK: she is wearing a turtle neck top so I cannot see the thyroid.   EYES: Eyes grossly normal to inspection.  No discharge or erythema, or obvious scleral/conjunctival abnormalities.  RESP: No audible wheeze, cough, or visible cyanosis.  No visible retractions or increased work of breathing.    NEURO: Cranial nerves grossly intact.  Awake, alert, responds appropriately to questions.  Mentation and speech fluent. + fine tremor of the outstretched hand.   PSYCH:affect normal, judgement and insight intact, and appearance well-groomed.    DATA REVIEW      EXAM: NM THYROID UPTAKE AND SCAN  LOCATION: [facility]  DATE/TIME: 10/20/2020 8:36 AM    INDICATION: Clinical and/or laboratory evidence of hyperthyroidism.  COMPARISON: None available at time of dictation.  TECHNIQUE: 242 uCi I-123, oral ingestion. 3-hour & 24-hour neck uptake and imaging.    FINDINGS:   3-hour uptake: 12.5% (Normal range: 4-10%).  24-hour uptake: 28.3% (Normal range: 10-30%).    Diffuse radiotracer uptake throughout the thyroid gland suspicious for Graves' disease without hot/cold nodule.   Other Result Information   Interface, Rad Results In - 10/20/2020  8:43 AM CDT  EXAM: NM THYROID UPTAKE AND SCAN  LOCATION: [facility]  DATE/TIME: 10/20/2020 8:36 AM    INDICATION: Clinical and/or laboratory evidence of hyperthyroidism.  COMPARISON: None available at time of dictation.  TECHNIQUE: 242 uCi I-123, oral ingestion. 3-hour & 24-hour neck uptake and imaging.    FINDINGS:   3-hour uptake: 12.5% (Normal range: 4-10%).  24-hour uptake: 28.3% (Normal range: 10-30%).    Diffuse radiotracer uptake throughout the thyroid gland suspicious for Graves' disease without hot/cold nodule.     US Myogogl05/2/2020  [facility]  Result Impression   1.  Normal size thyroid with bilateral heterogeneous echotexture.  2.  No significant nodule  seen.      Nodules are characterized per  ACR Thyroid Imaging, Reporting and Data System (TI-RADS): White Paper of the ACR TI-RADS Committee  Misha Grewal et al. Journal of the American College of Radiology 2017. Volume 14 (2017), Issue 5, 586-029.    Other Result Information   This result has an attachment that is not available.   Result Narrative   EXAM: US THYROID  LOCATION: [facility]  DATE/TIME: 11/2/2020 9:19 AM    INDICATION: Thyrotoxicosis, unspecified without thyrotoxic crisis or storm  COMPARISON: None.  TECHNIQUE: Thyroid ultrasound.     FINDINGS:  RIGHT lobe: 3.7 x 1.5 x 1.4 cm. Heterogeneous without discrete nodule.  Isthmus: 2 mm.  LEFT lobe: 3.5 x 1.6 x 1.5 cm. Heterogeneous with a mid pole 4 x 3 x 2 mm hypoechoic nodule or complex cyst.    NECK: No cervical lymphadenopathy.    NODULES:    Nodule 1: Left mid pole 4 x 3 x 2 mm.   Composition: Mixed cystic and solid, 1 point   Echogenicity: Unable to determine, 1 point   Shape: Wider-than-tall, 0 points   Margin: Ill-defined, 0 points   Echogenic Foci: None, or large comet-tail artifacts, 0 points   Point Total: 1-2 points. TI-RADS 2. No FNA.               Again, thank you for allowing me to participate in the care of your patient.      Sincerely,    Christiane Montero MD

## 2020-11-05 ENCOUNTER — TELEPHONE (OUTPATIENT)
Dept: ENDOCRINOLOGY | Facility: CLINIC | Age: 70
End: 2020-11-05

## 2020-11-05 ENCOUNTER — COMMUNICATION - HEALTHEAST (OUTPATIENT)
Dept: INTERNAL MEDICINE | Facility: CLINIC | Age: 70
End: 2020-11-05

## 2020-11-05 NOTE — TELEPHONE ENCOUNTER
Per Dr. Montero 11/4 checkout:    Help her get the lab order to [facility]- she need this this week.

## 2020-11-05 NOTE — TELEPHONE ENCOUNTER
Per Dr. Montero 11/4 checkout:    MAREK to get the images from 11/2/2020 pushed to PACS. This should have been done as pre-visit planning. Let me know when they arrive

## 2020-11-11 ENCOUNTER — AMBULATORY - HEALTHEAST (OUTPATIENT)
Dept: LAB | Facility: CLINIC | Age: 70
End: 2020-11-11

## 2020-11-11 ENCOUNTER — COMMUNICATION - HEALTHEAST (OUTPATIENT)
Dept: INTERNAL MEDICINE | Facility: CLINIC | Age: 70
End: 2020-11-11

## 2020-11-11 DIAGNOSIS — M35.00 SICCA SYNDROME (H): ICD-10-CM

## 2020-11-11 DIAGNOSIS — R79.89 LOW TSH LEVEL: ICD-10-CM

## 2020-11-13 LAB — THYROID STIMULATING IMMUNOGLOBULIN-TSI - HISTORICAL: 2.93 IU/L

## 2020-11-18 DIAGNOSIS — E05.00 GRAVES DISEASE: ICD-10-CM

## 2020-11-18 DIAGNOSIS — E05.90 SUBCLINICAL HYPERTHYROIDISM: Primary | ICD-10-CM

## 2020-11-18 RX ORDER — METHIMAZOLE 5 MG/1
5 TABLET ORAL DAILY
Qty: 30 TABLET | Refills: 3 | Status: SHIPPED | OUTPATIENT
Start: 2020-11-18 | End: 2021-01-14

## 2020-12-10 ENCOUNTER — TELEPHONE (OUTPATIENT)
Dept: ENDOCRINOLOGY | Facility: CLINIC | Age: 70
End: 2020-12-10

## 2020-12-10 ENCOUNTER — COMMUNICATION - HEALTHEAST (OUTPATIENT)
Dept: LAB | Facility: CLINIC | Age: 70
End: 2020-12-10

## 2020-12-10 NOTE — TELEPHONE ENCOUNTER
[facility] Call Center    Phone Message    May a detailed message be left on voicemail: yes     Reason for Call: Other: Deanna with [facility] called looking to get pts labs faxed to [phone] ATTN: Deanna. Pt has a lab appt scheduled for 12/16/20 but they dont have orders.  Please follow up    Action Taken: Message routed to:  [facility] ([facility]): ENdo    Travel Screening: Not Applicable

## 2020-12-15 ENCOUNTER — AMBULATORY - HEALTHEAST (OUTPATIENT)
Dept: LAB | Facility: CLINIC | Age: 70
End: 2020-12-15

## 2020-12-15 DIAGNOSIS — E05.90 SUBCLINICAL HYPERTHYROIDISM: ICD-10-CM

## 2020-12-16 ENCOUNTER — AMBULATORY - HEALTHEAST (OUTPATIENT)
Dept: LAB | Facility: CLINIC | Age: 70
End: 2020-12-16

## 2020-12-16 DIAGNOSIS — E05.90 SUBCLINICAL HYPERTHYROIDISM: ICD-10-CM

## 2020-12-16 LAB
T3 SERPL-MCNC: 63 NG/DL (ref 45–175)
T4 FREE SERPL-MCNC: 0.8 NG/DL (ref 0.7–1.8)
TSH SERPL DL<=0.005 MIU/L-ACNC: 0.07 UIU/ML (ref 0.3–5)

## 2021-01-09 ENCOUNTER — HEALTH MAINTENANCE LETTER (OUTPATIENT)
Age: 71
End: 2021-01-09

## 2021-01-13 ENCOUNTER — AMBULATORY - HEALTHEAST (OUTPATIENT)
Dept: LAB | Facility: CLINIC | Age: 71
End: 2021-01-13

## 2021-01-13 DIAGNOSIS — E05.90 SUBCLINICAL HYPERTHYROIDISM: ICD-10-CM

## 2021-01-13 LAB
T3 SERPL-MCNC: 71 NG/DL (ref 45–175)
T4 FREE SERPL-MCNC: 0.7 NG/DL (ref 0.7–1.8)
TSH SERPL DL<=0.005 MIU/L-ACNC: 2.86 UIU/ML (ref 0.3–5)

## 2021-01-14 DIAGNOSIS — E05.90 SUBCLINICAL HYPERTHYROIDISM: ICD-10-CM

## 2021-01-14 DIAGNOSIS — E05.00 GRAVES DISEASE: ICD-10-CM

## 2021-01-14 RX ORDER — METHIMAZOLE 5 MG/1
5 TABLET ORAL DAILY
Qty: 30 TABLET | Refills: 3 | Status: SHIPPED | OUTPATIENT
Start: 2021-01-14 | End: 2021-02-15

## 2021-02-10 ENCOUNTER — AMBULATORY - HEALTHEAST (OUTPATIENT)
Dept: LAB | Facility: CLINIC | Age: 71
End: 2021-02-10

## 2021-02-10 DIAGNOSIS — E05.90 SUBCLINICAL HYPERTHYROIDISM: ICD-10-CM

## 2021-02-10 LAB
T3 SERPL-MCNC: 72 NG/DL (ref 45–175)
T4 FREE SERPL-MCNC: 0.7 NG/DL (ref 0.7–1.8)
TSH SERPL DL<=0.005 MIU/L-ACNC: 4.95 UIU/ML (ref 0.3–5)

## 2021-02-12 NOTE — PROGRESS NOTES
Ivon is a 71 year old who is being evaluated via a billable video visit.      How would you like to obtain your AVS? Financuba     Text video visit link to: [phone] (Doximity)    Will anyone else be joining your video visit? No    Melani Christie MA

## 2021-02-15 ENCOUNTER — VIRTUAL VISIT (OUTPATIENT)
Dept: ENDOCRINOLOGY | Facility: CLINIC | Age: 71
End: 2021-02-15
Payer: COMMERCIAL

## 2021-02-15 DIAGNOSIS — E05.90 SUBCLINICAL HYPERTHYROIDISM: ICD-10-CM

## 2021-02-15 DIAGNOSIS — E05.00 GRAVES DISEASE: ICD-10-CM

## 2021-02-15 PROCEDURE — 99214 OFFICE O/P EST MOD 30 MIN: CPT | Mod: 95

## 2021-02-15 RX ORDER — METHIMAZOLE 5 MG/1
2.5 TABLET ORAL DAILY
Qty: 30 TABLET | Refills: 1 | Status: SHIPPED | OUTPATIENT
Start: 2021-02-15 | End: 2021-03-17

## 2021-02-15 NOTE — LETTER
2/15/2021       RE: Ivon Newell  [address] [place] [id number]     Dear Colleague,    Thank you for referring your patient, Ivon Newell, to the [facility] ENDOCRINOLOGY [facility]IN[facility] [place] at [facility]. Please see a copy of my visit note below.    Ivon is a 71 year old who is being evaluated via a billable video visit.      How would you like to obtain your AVS? Incoming Media     Text video visit link to: [phone] (Doximity)    Will anyone else be joining your video visit? No    Melani Christie MA      Endocrine Consult Video visit note-     Attending Assessment/Plan :     Graves' Hyperthyroidism , subclinical,  Unstable.  On MMI 5 mg/day since 11/2020.    Reduce methimazole to 2.5 mg/day.  Repeat labs one month    Left nodule 0.4 x 0.2 c 0.3      Sjogren syndrome in Ivon.  Family history of autoimune diseases    Low bone density - not addressed    Post menopausal -    Due to the COVID 19 pandemic this visit was a telephone/video visit in order to help prevent spread of infection in this high risk patient and the general population. The patient gave verbal consent for the visit today.    I have independently reviewed and interpreted labs, imaging as indicated.     Chart review/prep time 1  [id number]  Charting/orders  1309-312  Visit Start time 1300  Visit Stop time  1309  _17_ minutes spent on the date of the encounter doing chart review, history and exam, documentation and further activities as noted above.      Christiane Montero MD    Chief complaint/ HISTORY OF PRESENT ILLNESS    Ivon returns for follow up of subclinical hyperthyroidism. I have seen her once before in 11/4/2020.  Istarted her on methimazole 5 mg/day on 11/11/2020.  She has been getting monthly labs which have shown progressive improvement.  Today she reports significant improvement in symptoms she was having prior to the treatment.      We have the following labs:   9/25/09 TSH  2.24  1/9/15 TSH 2.43  1/23/15 RF 35.8 (H), DOMINIQUE 6.7 (H)< SSA /Ro 183 (H)< SSB/La 37 (H)  1/15/16 TSH 2.3  10/14/2020 TSH < 0.01, free T4 1.1, T3 126, CAROLINA 42.7, .7, TRAB 1.53 25OHD 34.5, Ca 10  2020 TSI 2.93, TSH < 0.01, free T4 1.1, T3 126  2020 TSH 0.097, free T4 0.8, T3 63 on MMI 5 mg/day  2021 TSH 2.86, free T4 0.7, T3 71 on MMI 5 mg/day  2/10/2021 TSH 4.95, free T4 0.7, T3 72 on MMI 5 mg/day    Weights  10/14/2020 147 #   2020 158  19 156    16 DXA lowest T-score -2.2 right femoral neck  18 DXA lowest T-score -1.7 right femoral neck ; FRAX 14.6/1.5%  10/20/2020 123I thyorid uptake/scan [facility] 24 hour uptake 28.3-- I have reviewed the imges on pACS - they show diffuse uptake  Left > right .    2020 thyroid US [facility]-- the images for this study were not pushed to PACS and cannot be viewed by me today    2020 Addendum: review of late images to PACS from 2020 thyroid US:   Thyroid normal size, slightly heterogeneous, grade 3 blood flow on some images, grade 1 on image with CCA (presumably gain is turned way down)  Right inferior nodule 0.3 x 0.2 x 0.3 cm hypoechoic  Left nodule 0.4 x 0.2 c 0.3       REVIEW OF SYSTEMS  Feels better  Not napping; sleep is better  Weight is ? Up-- doesn't have a scale; has been trying to lose weight.    Energy is improved  No longer has throat discomfort  Eyes: headaches behind th eleft eye; one eye feels gritty but she notes she has sjogrens; no diplopia;   Cardiac: less heart pounding; now only 2-3 times/week which is an improvement  Respiratory: negative; walking 2 miles 5-6 ays/week prior to extreme cold weather  GI: BM;     no sweating  No shaking    Past Medical History  Past Medical History:   Diagnosis Date     Anxiety      Chronic cough      Hyperlipidemia      Low bone density      Sjogren's syndrome (H)      Past Surgical History:   Procedure Laterality Date      SECTION      x 2     ELBOW SURGERY        HYSTERECTOMY       Medications  Current Outpatient Medications   Medication Sig Dispense Refill     acetaminophen (TYLENOL) 325 MG tablet Take 650 mg by mouth       aspirin (ASA) 81 MG EC tablet Take 81 mg by mouth       calcium carbonate 600 mg-vitamin D 400 units (CALTRATE) 600-400 MG-UNIT per tablet Take 1 tablet by mouth 2 times daily       ibuprofen (ADVIL/MOTRIN) 200 MG tablet Take 200 mg by mouth       methimazole (TAPAZOLE) 5 MG tablet Take 1 tablet (5 mg) by mouth daily 30 tablet 3     Multiple Vitamins-Minerals (MULTIVITAMIN ADULT PO)        PARoxetine (PAXIL) 20 MG tablet Take 20 mg by mouth       simvastatin (ZOCOR) 20 MG tablet Take 20 mg by mouth       paxil has been x 4 years   150 mcg biotin in her MVI    Allergies  Allergies   Allergen Reactions     Bacitracin Itching and Other (See Comments)     hoarseness       Sulfa Drugs      Family History  family history includes Celiac Disease in her daughter; Diabetes (age of onset: 90) in her father; Goiter in her daughter; Rheumatoid Arthritis in her father; Thyroid nodules in her sister.    Social History  Social History     Tobacco Use     Smoking status: Not on file   Substance Use Topics     Alcohol use: Not on file     Drug use: Not on file       Physical Exam  There were no vitals taken for this visit.  There is no height or weight on file to calculate BMI.  GENERAL:pleasant woman in  no distress  SKIN: Visible skin clear. No significant rash, abnormal pigmentation or lesions.  EYES: glasses; Eyes grossly normal to inspection.  No discharge or erythema, or obvious scleral/conjunctival abnormalities. NO suggestion of LORETTA   RESP: No audible wheeze, cough, or visible cyanosis.  No visible retractions or increased work of breathing.    NEURO: Awake, alert, responds appropriately to questions.  Mentation and speech fluent.   PSYCH:affect normal, judgement and insight intact, and appearance well-groomed.        Christiane Montero MD

## 2021-02-15 NOTE — PROGRESS NOTES
Endocrine Consult Video visit note-     Attending Assessment/Plan :     Graves' Hyperthyroidism , subclinical,  Unstable.  On MMI 5 mg/day since 11/2020.    Reduce methimazole to 2.5 mg/day.  Repeat labs one month     5/5/2021 chart review  Labs on HE   3/17/2021 TSH 8.21, free T4 0.7, T3 68- on MMI 2.5 mg/day.  After this I told her to stop the MMI  3/29/2021 TSh 4.67, free T4 0.8, T3 76    Left nodule 0.4 x 0.2 c 0.3      Sjogren syndrome in Ivon.  Family history of autoimune diseases    Low bone density - not addressed    Post menopausal -    Due to the COVID 19 pandemic this visit was a telephone/video visit in order to help prevent spread of infection in this high risk patient and the general population. The patient gave verbal consent for the visit today.    I have independently reviewed and interpreted labs, imaging as indicated.     Chart review/prep time 1  [id number]  Charting/orders  1309-312  Visit Start time 1300  Visit Stop time  1309  _17_ minutes spent on the date of the encounter doing chart review, history and exam, documentation and further activities as noted above.      Christiane Montero MD    Chief complaint/ HISTORY OF PRESENT ILLNESS    Ivon returns for follow up of subclinical hyperthyroidism. I have seen her once before in 11/4/2020.  Istarted her on methimazole 5 mg/day on 11/11/2020.  She has been getting monthly labs which have shown progressive improvement.  Today she reports significant improvement in symptoms she was having prior to the treatment.      We have the following labs:   9/25/09 TSH 2.24  1/9/15 TSH 2.43  1/23/15 RF 35.8 (H), DOMINIQUE 6.7 (H)< SSA /Ro 183 (H)< SSB/La 37 (H)  1/15/16 TSH 2.3  10/14/2020 TSH < 0.01, free T4 1.1, T3 126, CAROLINA 42.7, .7, TRAB 1.53 25OHD 34.5, Ca 10  11/11/2020 TSI 2.93, TSH < 0.01, free T4 1.1, T3 126  12/16/2020 TSH 0.097, free T4 0.8, T3 63 on MMI 5 mg/day  1/13/2021 TSH 2.86, free T4 0.7, T3 71 on MMI 5 mg/day  2/10/2021 TSH 4.95, free T4  0.7, T3 72 on MMI 5 mg/day    Weights  10/14/2020 147 #   2020 158  19 156    16 DXA lowest T-score -2.2 right femoral neck  18 DXA lowest T-score -1.7 right femoral neck ; FRAX 14.6/1.5%  10/20/2020 [facility] 28.3-- I have reviewed the imges on pACS - they show diffuse uptake  Left > right .    2020 thyroid US [facility]-- the images for this study were not pushed to PACS and cannot be viewed by me today    2020 Addendum: review of late images to PACS from 2020 thyroid US:   Thyroid normal size, slightly heterogeneous, grade 3 blood flow on some images, grade 1 on image with CCA (presumably gain is turned way down)  Right inferior nodule 0.3 x 0.2 x 0.3 cm hypoechoic  Left nodule 0.4 x 0.2 c 0.3       REVIEW OF SYSTEMS  Feels better  Not napping; sleep is better  Weight is ? Up-- doesn't have a scale; has been trying to lose weight.    Energy is improved  No longer has throat discomfort  Eyes: headaches behind th eleft eye; one eye feels gritty but she notes she has sjogrens; no diplopia;   Cardiac: less heart pounding; now only 2-3 times/week which is an improvement  Respiratory: negative; walking 2 miles 5-6 ays/week prior to extreme cold weather  GI: BM;     no sweating  No shaking    Past Medical History  Past Medical History:   Diagnosis Date     Anxiety      Chronic cough      Hyperlipidemia      Low bone density      Sjogren's syndrome (H)      Past Surgical History:   Procedure Laterality Date      SECTION      x 2     ELBOW SURGERY       HYSTERECTOMY       Medications  Current Outpatient Medications   Medication Sig Dispense Refill     acetaminophen (TYLENOL) 325 MG tablet Take 650 mg by mouth       aspirin (ASA) 81 MG EC tablet Take 81 mg by mouth       calcium carbonate 600 mg-vitamin D 400 units (CALTRATE) 600-400 MG-UNIT per tablet Take 1 tablet by mouth 2 times daily       ibuprofen (ADVIL/MOTRIN) 200 MG tablet Take 200  mg by mouth       methimazole (TAPAZOLE) 5 MG tablet Take 1 tablet (5 mg) by mouth daily 30 tablet 3     Multiple Vitamins-Minerals (MULTIVITAMIN ADULT PO)        PARoxetine (PAXIL) 20 MG tablet Take 20 mg by mouth       simvastatin (ZOCOR) 20 MG tablet Take 20 mg by mouth       paxil has been x 4 years   150 mcg biotin in her MVI    Allergies  Allergies   Allergen Reactions     Bacitracin Itching and Other (See Comments)     hoarseness       Sulfa Drugs      Family History  family history includes Celiac Disease in her daughter; Diabetes (age of onset: 90) in her father; Goiter in her daughter; Rheumatoid Arthritis in her father; Thyroid nodules in her sister.    Social History  Social History     Tobacco Use     Smoking status: Not on file   Substance Use Topics     Alcohol use: Not on file     Drug use: Not on file       Physical Exam  There were no vitals taken for this visit.  There is no height or weight on file to calculate BMI.  GENERAL:pleasant woman in  no distress  SKIN: Visible skin clear. No significant rash, abnormal pigmentation or lesions.  EYES: glasses; Eyes grossly normal to inspection.  No discharge or erythema, or obvious scleral/conjunctival abnormalities. NO suggestion of LORETTA   RESP: No audible wheeze, cough, or visible cyanosis.  No visible retractions or increased work of breathing.    NEURO: Awake, alert, responds appropriately to questions.  Mentation and speech fluent.   PSYCH:affect normal, judgement and insight intact, and appearance well-groomed.

## 2021-02-15 NOTE — PATIENT INSTRUCTIONS
Reduce methimazole to 2.5 mg/day    Repeat labs in one month     See me every 4-6 months while you remain on the methimazole

## 2021-03-02 ENCOUNTER — COMMUNICATION - HEALTHEAST (OUTPATIENT)
Dept: INTERNAL MEDICINE | Facility: CLINIC | Age: 71
End: 2021-03-02

## 2021-03-02 DIAGNOSIS — M85.80 OSTEOPENIA: ICD-10-CM

## 2021-03-02 DIAGNOSIS — M85.89 OSTEOPENIA OF MULTIPLE SITES: ICD-10-CM

## 2021-03-05 ENCOUNTER — COMMUNICATION - HEALTHEAST (OUTPATIENT)
Dept: LAB | Facility: CLINIC | Age: 71
End: 2021-03-05

## 2021-03-10 ENCOUNTER — AMBULATORY - HEALTHEAST (OUTPATIENT)
Dept: LAB | Facility: CLINIC | Age: 71
End: 2021-03-10

## 2021-03-10 DIAGNOSIS — E05.90 SUBCLINICAL HYPERTHYROIDISM: ICD-10-CM

## 2021-03-17 ENCOUNTER — AMBULATORY - HEALTHEAST (OUTPATIENT)
Dept: LAB | Facility: CLINIC | Age: 71
End: 2021-03-17

## 2021-03-17 DIAGNOSIS — E05.90 SUBCLINICAL HYPERTHYROIDISM: ICD-10-CM

## 2021-03-17 LAB
T3 SERPL-MCNC: 68 NG/DL (ref 45–175)
T4 FREE SERPL-MCNC: 0.7 NG/DL (ref 0.7–1.8)
TSH SERPL DL<=0.005 MIU/L-ACNC: 8.21 UIU/ML (ref 0.3–5)

## 2021-03-19 DIAGNOSIS — E05.90 SUBCLINICAL HYPERTHYROIDISM: Primary | ICD-10-CM

## 2021-03-19 DIAGNOSIS — R79.89 HIGH SERUM THYROID STIMULATING HORMONE (TSH): ICD-10-CM

## 2021-03-19 DIAGNOSIS — E05.00 GRAVES DISEASE: ICD-10-CM

## 2021-03-23 ENCOUNTER — COMMUNICATION - HEALTHEAST (OUTPATIENT)
Dept: INTERNAL MEDICINE | Facility: CLINIC | Age: 71
End: 2021-03-23

## 2021-03-23 DIAGNOSIS — E78.5 HYPERLIPIDEMIA: ICD-10-CM

## 2021-03-23 DIAGNOSIS — F41.9 ANXIETY: ICD-10-CM

## 2021-03-29 ENCOUNTER — AMBULATORY - HEALTHEAST (OUTPATIENT)
Dept: LAB | Facility: CLINIC | Age: 71
End: 2021-03-29

## 2021-03-29 ENCOUNTER — MYC MEDICAL ADVICE (OUTPATIENT)
Dept: ENDOCRINOLOGY | Facility: CLINIC | Age: 71
End: 2021-03-29

## 2021-03-29 DIAGNOSIS — E05.00 GRAVES DISEASE: ICD-10-CM

## 2021-03-29 DIAGNOSIS — E05.90 SUBCLINICAL HYPERTHYROIDISM: ICD-10-CM

## 2021-03-29 DIAGNOSIS — R79.89 HIGH SERUM THYROID STIMULATING HORMONE (TSH): ICD-10-CM

## 2021-03-29 LAB
T3 SERPL-MCNC: 76 NG/DL (ref 45–175)
T4 FREE SERPL-MCNC: 0.8 NG/DL (ref 0.7–1.8)
TSH SERPL DL<=0.005 MIU/L-ACNC: 4.67 UIU/ML (ref 0.3–5)

## 2021-04-12 ENCOUNTER — HOSPITAL ENCOUNTER (OUTPATIENT)
Dept: MAMMOGRAPHY | Facility: CLINIC | Age: 71
Discharge: HOME | End: 2021-04-12
Attending: INTERNAL MEDICINE

## 2021-04-12 DIAGNOSIS — Z12.31 VISIT FOR SCREENING MAMMOGRAM: ICD-10-CM

## 2021-04-14 ENCOUNTER — RECORDS - HEALTHEAST (OUTPATIENT)
Dept: BONE DENSITY | Facility: CLINIC | Age: 71
End: 2021-04-14

## 2021-04-14 DIAGNOSIS — M85.89 OTHER SPECIFIED DISORDERS OF BONE DENSITY AND STRUCTURE, MULTIPLE SITES: ICD-10-CM

## 2021-04-14 DIAGNOSIS — M85.80 OTHER SPECIFIED DISORDERS OF BONE DENSITY AND STRUCTURE, UNSPECIFIED SITE: ICD-10-CM

## 2021-04-15 ENCOUNTER — RECORDS - HEALTHEAST (OUTPATIENT)
Dept: ADMINISTRATIVE | Facility: OTHER | Age: 71
End: 2021-04-15

## 2021-04-28 ENCOUNTER — AMBULATORY - HEALTHEAST (OUTPATIENT)
Dept: LAB | Facility: CLINIC | Age: 71
End: 2021-04-28

## 2021-04-28 ENCOUNTER — RECORDS - HEALTHEAST (OUTPATIENT)
Dept: ADMINISTRATIVE | Facility: OTHER | Age: 71
End: 2021-04-28

## 2021-04-28 DIAGNOSIS — E05.90 SUBCLINICAL HYPERTHYROIDISM: ICD-10-CM

## 2021-04-28 LAB
T3 SERPL-MCNC: 87 NG/DL (ref 45–175)
T4 FREE SERPL-MCNC: 0.8 NG/DL (ref 0.7–1.8)
TSH SERPL DL<=0.005 MIU/L-ACNC: 3.59 UIU/ML (ref 0.3–5)

## 2021-04-30 ENCOUNTER — RECORDS - HEALTHEAST (OUTPATIENT)
Dept: INTERNAL MEDICINE | Facility: CLINIC | Age: 71
End: 2021-04-30

## 2021-04-30 DIAGNOSIS — Z20.822 COVID-19 RULED OUT: ICD-10-CM

## 2021-05-05 ENCOUNTER — MYC MEDICAL ADVICE (OUTPATIENT)
Dept: ENDOCRINOLOGY | Facility: CLINIC | Age: 71
End: 2021-05-05

## 2021-05-05 NOTE — TELEPHONE ENCOUNTER
You have to hit REQUEST UPDATE in care everywhere then go to [facility]. You will see 4/21 labs  TSH 3.59  FT4 0.8  T3 Total 87  You may be able to see them now since I hit request update already

## 2021-05-24 ENCOUNTER — RECORDS - HEALTHEAST (OUTPATIENT)
Dept: ADMINISTRATIVE | Facility: CLINIC | Age: 71
End: 2021-05-24

## 2021-05-26 ENCOUNTER — RECORDS - HEALTHEAST (OUTPATIENT)
Dept: ADMINISTRATIVE | Facility: CLINIC | Age: 71
End: 2021-05-26

## 2021-05-27 ENCOUNTER — RECORDS - HEALTHEAST (OUTPATIENT)
Dept: ADMINISTRATIVE | Facility: CLINIC | Age: 71
End: 2021-05-27

## 2021-05-28 ENCOUNTER — RECORDS - HEALTHEAST (OUTPATIENT)
Dept: ADMINISTRATIVE | Facility: CLINIC | Age: 71
End: 2021-05-28

## 2021-05-28 NOTE — PROGRESS NOTES
Assessment/Plan:   Cloudy urine  Acute cystitis without hematuria  Mild URI, SI joint pain and cloudy urine this week with increasing odor to the urine and bladder pain today. UA suggestive of infection. Exam benign.   I discussed red flag symptoms, return precautions to clinic/ER and follow up care with patient/parent.  Expected clinical course, symptomatic cares advised. Questions answered. Patient/parent amenable with plan.  - Urinalysis-UC if Indicated  - Culture, Urine  - nitrofurantoin, macrocrystal-monohydrate, (MACROBID) 100 MG capsule; Take 1 capsule (100 mg total) by mouth 2 (two) times a day for 7 days.  Dispense: 14 capsule; Refill: 0    Drink lots of water  Nitrofurantoin twice a day for 7 days  Urine culture pending. We will call if a change needs to be made.   Yogurt or probiotics   Consider follow up with primary provider in a month or so to re-check a urine and make sure the blood clears. You could perhaps call your primary for a lab only urine order instead of a visit if no symptoms.   Recheck sooner if needed.     Subjective:      Ivon Newell is a 69 y.o. female who presents with cloudy urine. This has been occurring intermittently for 3-4 days. Worse today. No dysuria but now has some bladder pain that is continuous. No blood in urine. Mild urgency. No flank pain, no fever. Had chills one day this week. Has had URI sxs with mild ST, runny nose, minimal cough since the weekend as well. No sinus pain or HA, no vertigo. No shortness of breath. No fever. No N/V/D. Has chronic constipation, no change in usual bowel pattern. Has had a flare of SI joint pain since the weekend and has been taking tylenol and ibuprofen for that.  Had UTI last fall which resolved with the treatment given. UA at her physical in November had some blood persisting, UC negative. Prior UAs also often with trace blood. Recalls an episode not too long ago with blood in urine but no other symptoms and it did not persist so she  forgot about it. Non-smoker.     Allergies   Allergen Reactions     Bacitracin      Sulfa (Sulfonamide Antibiotics)      Current Outpatient Medications on File Prior to Visit   Medication Sig Dispense Refill     acetaminophen (TYLENOL) 325 MG tablet Take 650 mg by mouth every 6 (six) hours as needed for pain.       aspirin 81 MG EC tablet Take 81 mg by mouth daily.       ibuprofen (ADVIL,MOTRIN) 200 MG tablet Take 200 mg by mouth every 6 (six) hours as needed for pain.       MULTIVITAMIN (MULTIPLE VITAMINS ORAL) Take by mouth.       PARoxetine (PAXIL) 20 MG tablet Take 1 tablet (20 mg total) by mouth daily. 90 tablet 3     polyethylene glycol (MIRALAX) 17 gram packet Take 17 g by mouth daily as needed .             simvastatin (ZOCOR) 20 MG tablet Take 1 tablet (20 mg total) by mouth daily. 90 tablet 2     No current facility-administered medications on file prior to visit.      Patient Active Problem List   Diagnosis     Hyperlipemia     Osteopenia     Sjogren's Syndrome     Anxiety     Psychogenic cough       Objective:     /72 (Patient Site: Right Arm, Patient Position: Sitting, Cuff Size: Adult Regular)   Pulse 77   Temp 97.9  F (36.6  C) (Oral)   Resp 14   Wt 152 lb 9 oz (69.2 kg)   SpO2 100%   BMI 25.78 kg/m      Physical  General Appearance: Alert, cooperative, no distress  Head: Normocephalic, without obvious abnormality, atraumatic  Eyes: Conjunctivae are normal.  Ears: Normal TMs and external ear canals, both ears  Nose: No significant congestion.  Throat: Throat is normal.  No exudate.  No significant lesions  Neck: No adenopathy  Lungs: Clear to auscultation bilaterally, respirations unlabored  Heart: Regular rate and rhythm  Abdomen: soft, no CVA tenderness with percussion  Skin: no rashes or lesions  Psychiatric: Patient has a normal mood and affect.        Recent Results (from the past 24 hour(s))   Urinalysis-UC if Indicated   Result Value Ref Range    Color, UA Yellow Colorless, Yellow,  Straw, Light Yellow    Clarity, UA Cloudy (!) Clear    Glucose, UA Negative Negative    Bilirubin, UA Negative Negative    Ketones, UA Negative Negative    Specific Gravity, UA 1.015 1.005 - 1.030    Blood, UA Small (!) Negative    pH, UA 8.5 (H) 5.0 - 8.0    Protein, UA Negative Negative mg/dL    Urobilinogen, UA 0.2 E.U./dL 0.2 E.U./dL, 1.0 E.U./dL    Nitrite, UA Positive (!) Negative    Leukocytes, UA Large (!) Negative    Bacteria, UA Many (!) None Seen hpf    RBC, UA 3-5 (!) None Seen, 0-2 hpf    WBC, UA 25-50 (!) None Seen, 0-5 hpf    Squam Epithel, UA 0-5 None Seen, 0-5 lpf

## 2021-05-28 NOTE — PATIENT INSTRUCTIONS - HE
Drink lots of water  Nitrofurantoin twice a day for 7 days  Urine culture pending. We will call if a change needs to be made.   Yogurt or probiotics   Consider follow up with primary provider in a month or so to re-check a urine and make sure the blood clears. You could perhaps call your primary for a lab only urine order instead of a visit if no symptoms.   Recheck sooner if needed.

## 2021-05-29 NOTE — TELEPHONE ENCOUNTER
Pink eye in both eyes  Pt has a cough day 6, swollen glands and low grade temp  and plugged up and can breathe through her nose  Throat to her trachea feels congested and feels some shortness of breath with exertion and sinus sx  Cough and congested  Taking nyquil as needed  Coughing moderately  Cough sounds tight    Carmel Benavidez, RN [facility] RN Triage        Reason for Disposition    Continuous (nonstop) coughing interferes with work or school and no improvement using cough treatment per Care Advice    Protocols used: COUGH-A-OH

## 2021-05-29 NOTE — PROGRESS NOTES
Assessment/Plan:     1. Tick bite of abdomen, initial encounter  No evidence of systemic disease or erythema migrans.  Patient meets criteria for prophylactic dose of Doxycycline - 200 mg for 1 dose.  Monitor for fevers, worsening rash, arthralgias, or adenopathy.  Follow up if this should occur.     - doxycycline (VIBRA-TABS) 100 MG tablet; Take 2 tablets (200 mg total) by mouth once for 1 dose.  Dispense: 2 tablet; Refill: 0        Subjective:     Ivon Newell is a 69 y.o. female who presents with complaints of a tick bite.  Patient removed a deer tick from her left lower abdomen 2 days ago.  She is unsure how long it was attached.  Was still alive when she removed it.  Patient has had some redness to the bite site.  She believes remove the entire tick.  There was some redness around the site, but this has gotten better since onset.  Patient denies any history of Lyme disease.  She is feeling well.  No fevers, arthralgias, adenopathy, or other rashes.      The following portions of the patient's history were reviewed and updated as appropriate: allergies, current medications.    Review of Systems  A comprehensive review of systems was performed and was otherwise negative    Objective:     /64   Pulse 88   Temp 97.9  F (36.6  C)   Wt 156 lb 6.4 oz (70.9 kg)   BMI 26.43 kg/m      General Appearance: Alert, cooperative, no distress, appears stated age  Neck: Supple, symmetrical, trachea midline, no adenopathy  Lungs: Clear to auscultation bilaterally, respirations unlabored  Heart: Regular rate and rhythm, S1 and S2 normal, no murmur, rub, or gallop  Skin: ~2mm erythematous bite site to the left lower abdomen.  No surrounding redness or swelling.  Mild tenderness.  No other rashes.      Christelle Vargas, NP

## 2021-05-29 NOTE — PROGRESS NOTES
Assessment/Plan:     1. Viral URI with cough  Chest xray completed and personally reviewed as negative for infiltrate.  Final radiology report pending.   Antibiotics not indicated at this time.  Recommend symptomatic cares including rest and plenty of fluids.  I did offer a prescription for cough suppressant, but patient declines.  She will use over-the-counter Robitussin.  May consider daily antihistamine such as Claritin or Zyrtec for the itchy eyes and sinus drainage.  Discussed symptoms that would warrant follow-up including high fever, shortness of breath at rest, or worsening cough.  Patient verbalizes understanding.  - XR Chest 2 Views    2. Acute viral conjunctivitis of both eyes  Suspect this is viral and related to her upper respiratory symptoms.  Recommend cool/warm compresses to remove matter.  Antihistamine for eye itching.    3. Screen for colon cancer  - Ambulatory referral for Colonoscopy        Subjective:     Ivon Newell is a 69 y.o. female who presents with complaints of a cough, runny nose, and sore throat x1 week.  Associated symptoms include low-grade fevers up to 100.  She does not have significant congestion, but mild runny nose.  Cough is nonproductive.  She denies any wheezing or chest pain.  Mild shortness of breath with activity.  No history of asthma or smoking.  She does tend to get spring allergies.  Over the weekend, she woke up with some mattering to her left eye.  This morning, she woke up with both eyes mattered over.  She has been removing the matter with a warm washcloth.  They are not producing a large amount of drainage throughout the day.  They feel very itchy.  No pain or change in vision.  Over-the-counter treatments include DayQuil and Robitussin.      The following portions of the patient's history were reviewed and updated as appropriate: allergies, current medications, past family history, past medical history, past social history, past surgical history and problem  list.    Review of Systems  A comprehensive review of systems was performed and was otherwise negative    Objective:     /58   Pulse (!) 105   Temp 98.1  F (36.7  C)   Wt 153 lb 14.4 oz (69.8 kg)   SpO2 99%   BMI 26.01 kg/m      General Appearance: Alert, cooperative, no distress, appears stated age  Eyes: PERRL, conjunctiva/corneas clear, EOM's intact. No drainage.  Ears: Normal TM's and external ear canals, both ears  Nose: Nares normal, septum midline, mucosa normal, clear drainage  Throat: Lips, mucosa, and tongue normal; teeth and gums normal. Mild erythema to posterior pharynx. No tonsillar hypertrophy or exudate.  Neck: Supple, symmetrical, trachea midline, mild cervical lymphadenopathy  Lungs: Clear to auscultation bilaterally, respirations unlabored  Heart: Regular rate and rhythm, S1 and S2 normal, no murmur, rub, or gallop    Christelle Yaneztrom, NP

## 2021-05-29 NOTE — TELEPHONE ENCOUNTER
Looks like patient already has an appointment with Christelle Vargas today.  Krissy Westbrook CMA ............... 9:01 AM, 06/04/19

## 2021-05-31 VITALS — BODY MASS INDEX: 23.89 KG/M2 | WEIGHT: 143.4 LBS | HEIGHT: 65 IN

## 2021-06-02 ENCOUNTER — RECORDS - HEALTHEAST (OUTPATIENT)
Dept: ADMINISTRATIVE | Facility: CLINIC | Age: 71
End: 2021-06-02

## 2021-06-02 VITALS — BODY MASS INDEX: 25.29 KG/M2 | WEIGHT: 152 LBS

## 2021-06-02 VITALS — BODY MASS INDEX: 24.87 KG/M2 | WEIGHT: 149.3 LBS | HEIGHT: 65 IN

## 2021-06-02 VITALS — WEIGHT: 153.6 LBS | BODY MASS INDEX: 25.56 KG/M2

## 2021-06-02 VITALS — BODY MASS INDEX: 25.46 KG/M2 | WEIGHT: 153 LBS

## 2021-06-03 VITALS — WEIGHT: 156.4 LBS | BODY MASS INDEX: 26.43 KG/M2

## 2021-06-03 VITALS — BODY MASS INDEX: 25.78 KG/M2 | WEIGHT: 152.56 LBS

## 2021-06-03 VITALS — BODY MASS INDEX: 26.01 KG/M2 | WEIGHT: 153.9 LBS

## 2021-06-03 NOTE — TELEPHONE ENCOUNTER
Refill Approved    Rx renewed per Medication Renewal Policy. Medication was last renewed on 11/7/18.    Kadi Sorensen, [facility] Triage/Med Refill 11/4/2019     Requested Prescriptions   Pending Prescriptions Disp Refills     PARoxetine (PAXIL) 20 MG tablet [Pharmacy Med Name: PAROXETINE HCL 20 MG TABLET] 90 tablet 3     Sig: TAKE 1 TABLET BY MOUTH EVERY DAY       [facility] Refill Protocol  Passed - 11/4/2019 10:11 AM        Passed - PCP or prescribing provider visit in last year     Last office visit with prescriber/PCP: Visit date not found OR same dept: Visit date not found OR same specialty: 9/14/2018 Cain Henriquez, JORGE  Last physical: 11/13/2018 Last MTM visit: Visit date not found   Next visit within 3 mo: Visit date not found  Next physical within 3 mo: Visit date not found  Prescriber OR PCP: Errol Henderson MD  Last diagnosis associated with med order: 1. Anxiety  - PARoxetine (PAXIL) 20 MG tablet [Pharmacy Med Name: PAROXETINE HCL 20 MG TABLET]; TAKE 1 TABLET BY MOUTH EVERY DAY  Dispense: 90 tablet; Refill: 3    If protocol passes may refill for 12 months if within 3 months of last provider visit (or a total of 15 months).

## 2021-06-04 VITALS
SYSTOLIC BLOOD PRESSURE: 120 MMHG | HEART RATE: 91 BPM | DIASTOLIC BLOOD PRESSURE: 76 MMHG | BODY MASS INDEX: 26.7 KG/M2 | WEIGHT: 158 LBS | OXYGEN SATURATION: 97 %

## 2021-06-05 VITALS
HEIGHT: 65 IN | DIASTOLIC BLOOD PRESSURE: 72 MMHG | HEART RATE: 86 BPM | WEIGHT: 147.4 LBS | BODY MASS INDEX: 24.56 KG/M2 | SYSTOLIC BLOOD PRESSURE: 137 MMHG | OXYGEN SATURATION: 95 %

## 2021-06-07 NOTE — PROGRESS NOTES
Office Visit - Follow Up   Ivon Newell   70 y.o. female    Date of Visit: 4/22/2020    Chief Complaint   Patient presents with     Dizziness     x 2 week with any position of the head     Pain     Sharp pain Right armpit since first week of march        Assessment and Plan     Intermittent right axillary pain, onset around the same time that she began developing benign positional vertigo symptoms, around the first week of March 2020, about 6 weeks ago.  Although onset was around the same time, I cannot think of any way to relate these to symptoms.    Right axillary pain I would regard as indeterminate for cause right now.  On physical exam I do not detect any abnormalities, specifically I do not palpate any enlarged lymph nodes in either axilla, and she does not have any lymphadenopathy in other territories since I examined the cervical, supraclavicular, and also her spleen.  Not having any symptoms of fever, sweats, weight loss.  Systemically she feels good.  No pain down the right upper extremity, so I do not think this is radiculopathy.  Movement sometimes triggers her symptoms, but not consistently, and not any stereotypical movement.  I asked her to keep an eye on the symptom, and we will see if any clues develop.    Benign positional vertigo.  Onset also about 6 weeks ago, around first week of March, after having had viral URI symptoms.  Symptoms come and go, happen on most days lasting a few seconds.  It is triggered by looking up and turning her head to the left.  Her hearing is fine.  Her tympanic canals are clear today.  She drove over to the clinic with no problem today.  I told her hopefully this BPPV syndrome will resolve spontaneously.  Important stay well-hydrated.    Hyperlipidemia, on stable dose of simvastatin.    Anxiety, on stable dose of proximal teen.    Sjogren's syndrome, with characteristic dry eyes and dry mouth    Osteopenia  I suggested that she take calcium thousand milligrams a day,  plus vitamin D 2000 units a day.         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     Not having any symptoms of fever, sweats, weight loss.  Systemically she feels good.  No pain down the right upper extremity, so I do not think this is radiculopathy.  Movement sometimes triggers her symptoms, but not consistently, and not any stereotypical movement.    Has Sjogren's Syndrome with characteristic dry eyes and dry mouth, but no history of inflammatory arthritis    Hyperlipemia On statin    Osteopenia    Anxiety On Paxil, stable     Mixed hearing loss, bilateral    Wt Readings from Last 3 Encounters:   04/22/20 158 lb (71.7 kg)   06/04/19 153 lb 14.4 oz (69.8 kg)   05/20/19 156 lb 6.4 oz (70.9 kg)     BP Readings from Last 3 Encounters:   04/22/20 120/76   06/04/19 104/58   05/20/19 116/64       Lab Results   Component Value Date    WBC 5.0 11/28/2018    HGB 13.2 11/28/2018    HCT 39.2 11/28/2018     11/28/2018    CHOL 254 (H) 11/28/2018    TRIG 119 11/28/2018    HDL 93 11/28/2018    ALT 14 11/28/2018    AST 18 11/28/2018     11/28/2018    K 3.9 11/28/2018     11/28/2018    CREATININE 0.83 11/28/2018    BUN 13 11/28/2018    CO2 27 11/28/2018    TSH 2.30 01/15/2016           Medications, Allergies, Social, and Problem List   Current Outpatient Medications   Medication Sig Dispense Refill     aspirin 81 MG EC tablet Take 81 mg by mouth daily.       MULTIVITAMIN (MULTIPLE VITAMINS ORAL) Take by mouth.       PARoxetine (PAXIL) 20 MG tablet Take 1 tablet (20 mg total) by mouth daily. 90 tablet 2     simvastatin (ZOCOR) 20 MG tablet TAKE 1 TABLET BY MOUTH EVERY DAY 90 tablet 0     acetaminophen (TYLENOL) 325 MG tablet Take 650 mg by mouth every 6 (six) hours as needed for  pain.       ibuprofen (ADVIL,MOTRIN) 200 MG tablet Take 200 mg by mouth every 6 (six) hours as needed for pain.       No current facility-administered medications for this visit.      Allergies   Allergen Reactions     Bacitracin      Sulfa (Sulfonamide Antibiotics)      Social History     Tobacco Use     Smoking status: Never Smoker     Smokeless tobacco: Never Used   Substance Use Topics     Alcohol use: Yes     Alcohol/week: 3.0 standard drinks     Types: 3 Glasses of wine per week     Drug use: No     Patient Active Problem List   Diagnosis     Hyperlipemia     Osteopenia     Sjogren's Syndrome     Anxiety     Psychogenic cough        Reviewed, reconciled and updated       Physical Exam   General Appearance: Appears well, breathing comfortably, moves easily around exam room    /76 (Patient Site: Left Arm, Patient Position: Sitting, Cuff Size: Adult Regular)   Pulse 91   Wt 158 lb (71.7 kg)   SpO2 97%   BMI 26.70 kg/m      Lymph node examination included the anterior and posterior cervical regions, supraclavicular fossae, and both axillae.  I detected no lymphadenopathy whatsoever in these regions.    Lungs clear  Heart regular rhythm  Abdomen nontender, no enlargement of liver or spleen by palpation  Extremities no edema    Both ear canals clear.  Eye movements normal, no nystagmus seen.     Additional Information        Stan Tam MD

## 2021-06-07 NOTE — PATIENT INSTRUCTIONS - HE
Intermittent right axillary pain, onset around the same time that she began developing benign positional vertigo symptoms, around the first week of March 2020, about 6 weeks ago.  Although onset was around the same time, I cannot think of any way to relate these to symptoms.    Right axillary pain I would regard as indeterminate for cause right now.  On physical exam I do not detect any abnormalities, specifically I do not palpate any enlarged lymph nodes in either axilla, and she does not have any lymphadenopathy in other territories since I examined the cervical, supraclavicular, and also her spleen.  Not having any symptoms of fever, sweats, weight loss.  Systemically she feels good.  No pain down the right upper extremity, so I do not think this is radiculopathy.  Movement sometimes triggers her symptoms, but not consistently, and not any stereotypical movement.  I asked her to keep an eye on the symptom, and we will see if any clues develop.    Benign positional vertigo.  Onset also about 6 weeks ago, around first week of March, after having had viral URI symptoms.  Symptoms come and go, happen on most days lasting a few seconds.  It is triggered by looking up and turning her head to the left.  Her hearing is fine.  Her tympanic canals are clear today.  She drove over to the clinic with no problem today.  I told her hopefully this BPPV syndrome will resolve spontaneously.  Important stay well-hydrated.    Hyperlipidemia, on stable dose of simvastatin.    Anxiety, on stable dose of proximal teen.    Sjogren's syndrome, with characteristic dry eyes and dry mouth    Osteopenia  I suggested that she take calcium thousand milligrams a day, plus vitamin D 2000 units a day.

## 2021-06-09 NOTE — TELEPHONE ENCOUNTER
Refill Approved    Rx renewed per Medication Renewal Policy. Medication was last renewed on 4/8/20, last OV 4/22/20.    Fifi Sultana, [facility] Triage/Med Refill 7/3/2020     Requested Prescriptions   Pending Prescriptions Disp Refills     simvastatin (ZOCOR) 20 MG tablet [Pharmacy Med Name: SIMVASTATIN 20 MG TABLET] 90 tablet 0     Sig: TAKE 1 TABLET BY MOUTH EVERY DAY       [facility]ins Refill Protocol (Hmg CoA Reductase Inhibitors) Passed - 7/2/2020  9:37 AM        Passed - PCP or prescribing provider visit in past 12 months      Last office visit with prescriber/PCP: Visit date not found OR same dept: 4/22/2020 Stan Tam MD OR same specialty: 4/22/2020 Stan Tam MD  Last physical: 11/13/2018 Last MTM visit: Visit date not found   Next visit within 3 mo: Visit date not found  Next physical within 3 mo: Visit date not found  Prescriber OR PCP: Errol Henderson MD  Last diagnosis associated with med order: 1. Hyperlipidemia  - simvastatin (ZOCOR) 20 MG tablet [Pharmacy Med Name: SIMVASTATIN 20 MG TABLET]; TAKE 1 TABLET BY MOUTH EVERY DAY  Dispense: 90 tablet; Refill: 0    2. Anxiety  - PARoxetine (PAXIL) 20 MG tablet [Pharmacy Med Name: PAROXETINE HCL 20 MG TABLET]; TAKE 1 TABLET BY MOUTH EVERY DAY  Dispense: 90 tablet; Refill: 2    If protocol passes may refill for 12 months if within 3 months of last provider visit (or a total of 15 months).                PARoxetine (PAXIL) 20 MG tablet [Pharmacy Med Name: PAROXETINE HCL 20 MG TABLET] 90 tablet 2     Sig: TAKE 1 TABLET BY MOUTH EVERY DAY       [facility] Refill Protocol  Passed - 7/2/2020  9:37 AM        Passed - PCP or prescribing provider visit in last year     Last office visit with prescriber/PCP: Visit date not found OR same dept: 4/22/2020 Stan Tam MD OR same specialty: 4/22/2020 Stan Tam MD  Last physical: 11/13/2018 Last MTM visit: Visit date not found   Next visit within 3 mo: Visit date not found  Next physical  within 3 mo: Visit date not found  Prescriber OR PCP: Errol Henderson MD  Last diagnosis associated with med order: 1. Hyperlipidemia  - simvastatin (ZOCOR) 20 MG tablet [Pharmacy Med Name: SIMVASTATIN 20 MG TABLET]; TAKE 1 TABLET BY MOUTH EVERY DAY  Dispense: 90 tablet; Refill: 0    2. Anxiety  - PARoxetine (PAXIL) 20 MG tablet [Pharmacy Med Name: PAROXETINE HCL 20 MG TABLET]; TAKE 1 TABLET BY MOUTH EVERY DAY  Dispense: 90 tablet; Refill: 2    If protocol passes may refill for 12 months if within 3 months of last provider visit (or a total of 15 months).

## 2021-06-10 NOTE — PROGRESS NOTES
Assessment:     Healthy female exam.   All preventive exams are up-to-date.  Fasting labs will be done next week.  She will see Dermatologist for the mole check.      This note has been dictated using voice recognition software. Any grammatical or context distortions are unintentional and inherent to the software    1. Other screening mammogram  Mammo Screening Bilateral   2. Hyperlipemia  Lipid Profile   3. Health care maintenance  2(CBC w/o Differential)    Comprehensive Metabolic Panel    Urinalysis-UC if Indicated    Vitamin D, Total (25-Hydroxy)   4. Skin disorder  Ambulatory referral to Dermatology         There are no Patient Instructions on file for this visit.    Plan:          Return in about 1 year (around 5/5/2018) for Annual physical.    Subjective:       Chief Complaint   Patient presents with     Annual Exam     non fasting labs, due for pcv 13, dexa-4/4/16 q2, mammo-12/4/15, colon-6/25/14 q5, pap-hysterectomy       Ivon Newell is a 67 y.o. female who presents for an annual exam. No acute issues.    Healthy Habits:   Regular Exercise: Yes  Sunscreen Use: Yes  Healthy Diet: Yes  Dental Visits Regularly: Yes  Seat Belt: Yes  Immunization status: up to date and documented.    Health Maintenance   Topic Date Due     PNEUMOCOCCAL POLYSACCHARIDE VACCINE AGE 65 AND OVER  01/19/2017     PNEUMOCOCCAL CONJUGATE VACCINE FOR ADULTS (PCV13 OR PREVNAR)  01/19/2017     INFLUENZA VACCINE RULE BASED (Season Ended) 08/01/2017     MAMMOGRAM  12/04/2017     DXA SCAN  04/04/2018     FALL RISK ASSESSMENT  05/05/2018     TD 18+ HE  07/02/2018     ADVANCE DIRECTIVES DISCUSSED WITH PATIENT  01/15/2021     COLONOSCOPY  06/25/2024     TDAP ADULT ONE TIME DOSE  Completed     ZOSTER VACCINE  Completed       Social History     Social History     Marital status:      Spouse name: N/A     Number of children: N/A     Years of education: N/A     Occupational History     Not on file.     Social History Main Topics     Smoking  "status: Never Smoker     Smokeless tobacco: Never Used     Alcohol use 1.8 oz/week     3 Glasses of wine per week     Drug use: No     Sexual activity: No     Other Topics Concern     Not on file     Social History Narrative       Family History   Problem Relation Age of Onset     Colon cancer Maternal Grandmother      Breast cancer Maternal Aunt      Hyperlipidemia Mother      Diabetes Father      Heart disease Father      Stroke Father      Celiac disease Daughter        Patient Active Problem List   Diagnosis     Hyperlipemia     Osteopenia     Sjogren's Syndrome     Anxiety     Psychogenic cough       Current Outpatient Prescriptions on File Prior to Visit   Medication Sig Dispense Refill     aspirin 81 MG EC tablet Take 81 mg by mouth daily.       MULTIVITAMIN (MULTIPLE VITAMINS ORAL) Take by mouth.       PARoxetine (PAXIL) 20 MG tablet Take 1 tablet (20 mg total) by mouth daily. You will be due for a physical before more refills needed; January 30 tablet 4     simvastatin (ZOCOR) 20 MG tablet Take 1 tablet (20 mg total) by mouth daily. 90 tablet 0     [DISCONTINUED] CALCIUM ORAL Take by mouth.       [DISCONTINUED] cholecalciferol, vitamin D3, 1,000 unit tablet Take 1,000 Units by mouth daily.       [DISCONTINUED] efinaconazole 10 % Mary Apply 1 application topically daily. 4 mL 11     No current facility-administered medications on file prior to visit.        Review of Systems  A 12 point comprehensive review of systems was negative except as noted in HPI.         Objective:      /60  Pulse 68  Ht 5' 5\" (1.651 m)  Wt 143 lb 6.4 oz (65 kg)  BMI 23.86 kg/m2    Body mass index is 23.86 kg/(m^2).        Physical Exam:  General Appearance: Alert, cooperative, no distress, appears stated age.  Head: Normocephalic, without obvious abnormality, atraumatic  Eyes: PERRL, conjunctiva/corneas clear, EOM's intact  Ears: Normal TM's and external ear canals, both ears  Nose: Nares normal, septum midline,mucosa " normal, no drainage  Throat: Lips, mucosa, and tongue normal; teeth and gums normal  Neck: Supple, symmetrical, trachea midline, no adenopathy;  thyroid: not enlarged, symmetric, no tenderness/mass/nodules; no carotid bruit or JVD  Back: Symmetric, no curvature, ROM normal, no CVA tenderness.  Lungs: Clear to auscultation bilaterally, respirations unlabored.  Breasts: No breast masses, tenderness, asymmetry, or nipple discharge.  Heart: Regular rate and rhythm, S1 and S2 normal, no murmur, rub, or gallop.  Abdomen: Soft, non-tender, bowel sounds active all four quadrants,  no masses, no organomegaly.  Pelvic:Not done.  Extremities: Extremities normal, atraumatic, no cyanosis or edema.  Skin: Skin color, texture, turgor normal, no rashes or lesions. She does have large number of moles on her back. Most of them look like seborrheic keratosis.  Lymph nodes: Cervical, supraclavicular, and axillary nodes normal.  Neurologic: No focal neurological findings.

## 2021-06-12 NOTE — TELEPHONE ENCOUNTER
Authorizing: Errol Henderson MD in [facility]    Referral: [id number] (Pending Review)           Priority: Routine   Diagnosis: Subclinical hyperthyroidism     Is [facility] able to treat or should the patient see an MD with [facility] or [facility]?

## 2021-06-12 NOTE — TELEPHONE ENCOUNTER
The order was placed too late. Do you want the patient to come in and get drawn again?  Krissy Westbrook CMA ............... 8:32 AM, 10/22/20

## 2021-06-12 NOTE — TELEPHONE ENCOUNTER
Per Dr. Henderson:  Can you please ask the lab if we can add thyroid stimulating immunoglobulin to her blood from last week? Thanks

## 2021-06-12 NOTE — PROGRESS NOTES
Assessment and Plan:     Patient has been advised of split billing requirements and indicates understanding: Yes  1. Routine general medical examination at a health care facility      2. Anxiety  Stable on Paxil.  - Urinalysis-UC if Indicated  - Thyroid Stimulating Hormone (TSH)    3. Hyperlipidemia, unspecified hyperlipidemia type  On simvastatin.  - Lipid Profile  - [facility](CBC w/o Differential)  - Comprehensive Metabolic Panel    4. Osteopenia  Due for DXA.  - Vitamin D, Total (25-Hydroxy)  - DXA Bone Density Scan; Future    5. Sjogren's Syndrome  STable, did not see Rheum for many years. No significant dryness in eyes and mouth. Concern about non hodgkin lymphoma related to Sjogen. CXR will be done today. Mammogram was normal a year ago.    6. Chronic cough  For many years, better since on Paxil. But still present.    7. Axillary pain, right  Since April 2020 when was seen. Comes and goes, can be sharp or ache, can last for 2 days after gardening. Always provoked with some physical work.     The patient's current medical problems were reviewed.    I have had an Advance Directives discussion with the patient.  The following health maintenance schedule was reviewed with the patient and provided in printed form in the after visit summary:   Health Maintenance   Topic Date Due     ZOSTER VACCINES (2 of 3) 01/03/2014     DXA SCAN  12/03/2020     MAMMOGRAM  02/13/2021     MEDICARE ANNUAL WELLNESS VISIT  10/14/2021     FALL RISK ASSESSMENT  10/14/2021     ADVANCE CARE PLANNING  11/13/2023     LIPID  11/28/2023     COLORECTAL CANCER SCREENING  08/23/2024     TD 18+ HE  09/04/2028     HEPATITIS C SCREENING  Completed     Pneumococcal Vaccine: 65+ Years  Completed     INFLUENZA VACCINE RULE BASED  Completed     Pneumococcal Vaccine: Pediatrics (0 to 5 Years) and At-Risk Patients (6 to 64 Years)  Aged Out     HEPATITIS B VACCINES  Aged Out        Subjective:   Chief Complaint: Ivon Newell is an 70 y.o. female here for an  Annual Wellness visit.   HPI:  Acute and chronic medical problems discussed.    Review of Systems:    Please see above.  The rest of the review of systems are negative for all systems.    Patient Care Team:  Errol Henderson MD as PCP - General (Internal Medicine)  Stan Tam MD as Assigned PCP     Patient Active Problem List   Diagnosis     Hyperlipemia     Osteopenia     Sjogren's Syndrome     Anxiety     Psychogenic cough     Benign paroxysmal positional vertigo, unspecified laterality     No past medical history on file.   Past Surgical History:   Procedure Laterality Date     BREAST CYST ASPIRATION      about 12 to 13 years ago     HYSTERECTOMY       UT TOTAL ABDOM HYSTERECTOMY      Description: Total Abdominal Hysterectomy;  Recorded: 07/20/2012;     UT TOTAL ABDOM HYSTERECTOMY      Description: Hysterectomy;  Proc Date: 01/01/1996;     UT VAGINAL HYSTERECTOMY,UTERUS 250 GMS/<      Description: Vaginal Hysterectomy;  Recorded: 11/08/2013;      Family History   Problem Relation Age of Onset     Colon cancer Maternal Grandmother      Breast cancer Maternal Aunt      Hyperlipidemia Mother      Dementia Mother      Diabetes Father      Heart disease Father      Stroke Father      Celiac disease Daughter      Hypertension Sister       Social History     Socioeconomic History     Marital status:      Spouse name: Not on file     Number of children: Not on file     Years of education: Not on file     Highest education level: Not on file   Occupational History     Not on file   Social Needs     Financial resource strain: Not on file     Food insecurity     Worry: Not on file     Inability: Not on file     Transportation needs     Medical: Not on file     Non-medical: Not on file   Tobacco Use     Smoking status: Never Smoker     Smokeless tobacco: Never Used   Substance and Sexual Activity     Alcohol use: Yes     Alcohol/week: 3.0 standard drinks     Types: 3 Glasses of wine per week     Drug use: No  "    Sexual activity: Never   Lifestyle     Physical activity     Days per week: Not on file     Minutes per session: Not on file     Stress: Not on file   Relationships     Social connections     Talks on phone: Not on file     Gets together: Not on file     Attends Tenriism service: Not on file     Active member of club or organization: Not on file     Attends meetings of clubs or organizations: Not on file     Relationship status: Not on file     Intimate partner violence     Fear of current or ex partner: Not on file     Emotionally abused: Not on file     Physically abused: Not on file     Forced sexual activity: Not on file   Other Topics Concern     Not on file   Social History Narrative     Not on file      Current Outpatient Medications   Medication Sig Dispense Refill     acetaminophen (TYLENOL) 325 MG tablet Take 650 mg by mouth every 6 (six) hours as needed for pain.       aspirin 81 MG EC tablet Take 81 mg by mouth daily.       ibuprofen (ADVIL,MOTRIN) 200 MG tablet Take 200 mg by mouth every 6 (six) hours as needed for pain.       MULTIVITAMIN (MULTIPLE VITAMINS ORAL) Take by mouth.       PARoxetine (PAXIL) 20 MG tablet Take 1 tablet (20 mg total) by mouth daily. 90 tablet 2     simvastatin (ZOCOR) 20 MG tablet Take 1 tablet (20 mg total) by mouth daily. 90 tablet 2     No current facility-administered medications for this visit.       Objective:   Vital Signs:   Visit Vitals  /72   Pulse 86   Ht 5' 5\" (1.651 m)   Wt 147 lb 6.4 oz (66.9 kg)   SpO2 95%   BMI 24.53 kg/m           VisionScreening:  No exam data present     PHYSICAL EXAM  General Appearance: Alert, cooperative, no distress, appears stated age.  Head: Normocephalic, without obvious abnormality, atraumatic  Eyes: PERRL, conjunctiva/corneas clear, EOM's intact  Ears: Normal TM's and external ear canals, both ears  Nose: Nares normal, septum midline,mucosa normal, no drainage  Throat: Lips, mucosa, and tongue normal; teeth and gums " normal  Neck: Supple, symmetrical, trachea midline, no adenopathy;  thyroid: not enlarged, symmetric, no tenderness/mass/nodules; no carotid bruit or JVD  Back: Symmetric, no curvature, ROM normal, no CVA tenderness.  Lungs: Clear to auscultation bilaterally, respirations unlabored.  Breasts: No breast masses, tenderness, asymmetry, or nipple discharge.  Heart: Regular rate and rhythm, S1 and S2 normal, no murmur, rub, or gallop.  Abdomen: Soft, non-tender, bowel sounds active all four quadrants,  no masses, no organomegaly.  Pelvic:declined  Extremities: Extremities normal, atraumatic, no cyanosis or edema.  Skin: Skin color, texture, turgor normal, no rashes or lesions.  Lymph nodes: Cervical, supraclavicular, and axillary nodes normal.  Neurologic: No focal neurological findings.      Assessment Results 10/14/2020   Activities of Daily Living No help needed   Instrumental Activities of Daily Living No help needed   Get Up and Go Score -   Mini Cog Total Score 5   Some recent data might be hidden     A Mini-Cog score of 0-2 suggests the possibility of dementia, score of 3-5 suggests no dementia      Identified Health Risks:     She is at risk for lack of exercise and has been provided with information to increase physical activity for the benefit of her well-being.  The patient was counseled and encouraged to consider modifying their diet and eating habits. She was provided with information on recommended healthy diet options.  Information on urinary incontinence and treatment options given to patient.  Patient's advanced directive was discussed and I am comfortable with the patient's wishes.

## 2021-06-13 NOTE — TELEPHONE ENCOUNTER
"Patient is on our lab schedule 12/16 for,     \"thyroid.\" As there are no orders in her chart, please place if appropriate or have someone from your care team reach out to patient.   Thank you,  Dee Hernandez     "

## 2021-06-13 NOTE — TELEPHONE ENCOUNTER
Patient on lab schedule tomorrow still with no orders. Wondering if orders have come through yet?  Thank you for calling Endo Office,  Dee Hernandez

## 2021-06-13 NOTE — TELEPHONE ENCOUNTER
I called and spoke to staff at Endo office and I'm waiting for order's for the lab's to be faxed to this office so she can have her labs drawn.

## 2021-06-13 NOTE — TELEPHONE ENCOUNTER
Patient is seeing Endo in [facility] [place] [id number] [facility]    3rd [place] [address] [phone]   Christiane Montero MD    [address] [place] [id number] [phone] (Fax)   Subclinical hyperthyroidism (Primary Dx);   Tremor;   Palpitations     Please call her Endo for orders.  Thanks

## 2021-06-15 NOTE — TELEPHONE ENCOUNTER
"Patient is on our lab only schedule for, \"lablab\" which is noted in appt notes. I believe she is supposed to get monthly labs but there are no standing orders or any other order. Please place orders if appropriate or reach out to patient with next steps.   Thank you,  Dee Hernandez    "

## 2021-06-16 NOTE — TELEPHONE ENCOUNTER
Refill Approved    Rx renewed per Medication Renewal Policy. Medication was last renewed on 7/3/20.    Abdoul Zhu, [facility] Triage/Med Refill 3/24/2021     Requested Prescriptions   Pending Prescriptions Disp Refills     simvastatin (ZOCOR) 20 MG tablet [Pharmacy Med Name: SIMVASTATIN 20 MG TABLET] 90 tablet 2     Sig: TAKE 1 TABLET BY MOUTH EVERY DAY       Statins Refill Protocol (Hmg CoA Reductase Inhibitors) Passed - 3/23/2021 12:30 AM        Passed - PCP or prescribing provider visit in past 12 months      Last office visit with prescriber/PCP: Visit date not found OR same dept: 4/22/2020 Stan Tam MD OR same specialty: 4/22/2020 Stan Tam MD  Last physical: 10/14/2020 Last MTM visit: Visit date not found   Next visit within 3 mo: Visit date not found  Next physical within 3 mo: Visit date not found  Prescriber OR PCP: Errol Henderson MD  Last diagnosis associated with med order: 1. Hyperlipidemia  - simvastatin (ZOCOR) 20 MG tablet [Pharmacy Med Name: SIMVASTATIN 20 MG TABLET]; TAKE 1 TABLET BY MOUTH EVERY DAY  Dispense: 90 tablet; Refill: 2    2. Anxiety  - PARoxetine (PAXIL) 20 MG tablet [Pharmacy Med Name: PAROXETINE HCL 20 MG TABLET]; TAKE 1 TABLET BY MOUTH EVERY DAY  Dispense: 90 tablet; Refill: 2    If protocol passes may refill for 12 months if within 3 months of last provider visit (or a total of 15 months).                PARoxetine (PAXIL) 20 MG tablet [Pharmacy Med Name: PAROXETINE HCL 20 MG TABLET] 90 tablet 2     Sig: TAKE 1 TABLET BY MOUTH EVERY DAY       [facility] Refill Protocol  Passed - 3/23/2021 12:30 AM        Passed - PCP or prescribing provider visit in last year     Last office visit with prescriber/PCP: Visit date not found OR same dept: 4/22/2020 Stan Tam MD OR same specialty: 4/22/2020 Stan Tam MD  Last physical: 10/14/2020 Last MTM visit: Visit date not found   Next visit within 3 mo: Visit date not found  Next physical within 3 mo: Visit  date not found  Prescriber OR PCP: Errol Henderson MD  Last diagnosis associated with med order: 1. Hyperlipidemia  - simvastatin (ZOCOR) 20 MG tablet [Pharmacy Med Name: SIMVASTATIN 20 MG TABLET]; TAKE 1 TABLET BY MOUTH EVERY DAY  Dispense: 90 tablet; Refill: 2    2. Anxiety  - PARoxetine (PAXIL) 20 MG tablet [Pharmacy Med Name: PAROXETINE HCL 20 MG TABLET]; TAKE 1 TABLET BY MOUTH EVERY DAY  Dispense: 90 tablet; Refill: 2    If protocol passes may refill for 12 months if within 3 months of last provider visit (or a total of 15 months).

## 2021-06-18 NOTE — PATIENT INSTRUCTIONS - HE
Patient Instructions by Errol Henderson MD at 10/14/2020  8:00 AM     Author: Errol Henderson MD Service: -- Author Type: Physician    Filed: 10/14/2020  8:14 AM Encounter Date: 10/14/2020 Status: Addendum    : Errol Henderson MD (Physician)    Related Notes: Original Note by Errol Henderson MD (Physician) filed at 10/14/2020  8:13 AM       Due for Shingrix.  DXA to schedule after Dec 2020.  Patient Education     Exercise for a Healthier Heart  You may wonder how you can improve the health of your heart. If youre thinking about exercise, youre on the right track. You dont need to become an athlete, but you do need a certain amount of brisk exercise to help strengthen your heart. If you have been diagnosed with a heart condition, your doctor may recommend exercise to help stabilize your condition. To help make exercise a habit, choose safe, fun activities.       Be sure to check with your health care provider before starting an exercise program.    Why exercise?  Exercising regularly offers many healthy rewards. It can help you do all of the following:    Improve your blood cholesterol levels to help prevent further heart trouble    Lower your blood pressure to help prevent a stroke or heart attack    Control diabetes, or reduce your risk of getting this disease    Improve your heart and lung function    Reach and maintain a healthy weight    Make your muscles stronger and more limber so you can stay active    Prevent falls and fractures by slowing the loss of bone mass (osteoporosis)    Manage stress better  Exercise tips  Ease into your routine. Set small goals. Then build on them.  Exercise on most days. Aim for a total of 150 or more minutes of moderate to  vigorous intensity activity each week. Consider 40 minutes, 3 to 4 times a week. For best results, activity should last for 40 minutes on average. It is OK to work up to the 40 minute period over time. Examples of moderate-intensity activity is  walking one mile in 15 minutes or 30 to 45 minutes of yard work.  Step up your daily activity level. Along with your exercise program, try being more active throughout the day. Walk instead of drive. Do more household tasks or yard work.  Choose one or more activities you enjoy. Walking is one of the easiest things you can do. You can also try swimming, riding a bike, or taking an exercise class.  Stop exercising and call your doctor if you:    Have chest pain or feel dizzy or lightheaded    Feel burning, tightness, pressure, or heaviness in your chest, neck, shoulders, back, or arms    Have unusual shortness of breath    Have increased joint or muscle pain    Have palpitations or an irregular heartbeat      1857-5320 [employer]. [address] [place] [address]88. All rights reserved. This information is not intended as a substitute for professional medical care. Always follow your healthcare professional's instructions.         Patient Education   Understanding Tomveyi Bidamon MyPlate  The USDA (US Department of Agriculture) has guidelines to help you make healthy food choices. These are called MyPlate. MyPlate shows the food groups that make up healthy meals using the image of a place setting. Before you eat, think about the healthiest choices for what to put onto your plate or into your cup or bowl. To learn more about building a healthy plate, visit www.choosemyplate.gov.       The Food Groups    Fruits: Any fruit or 100% fruit juice counts as part of the Fruit Group. Fruits may be fresh, canned, frozen, or dried, and may be whole, cut-up, or pureed. Make half your plate fruits and vegetables.    Vegetables: Any vegetable or 100% vegetable juice counts as a member of the Vegetable Group. Vegetables may be fresh, frozen, canned, or dried. They can be served raw or cooked and may be whole, cut-up, or mashed. Make half your plate fruits and vegetables.     Grains: All foods made from grains are part of  the Grains Group. These include wheat, rice, oats, cornmeal, and barley such as bread, pasta, oatmeal, cereal, tortillas, and grits. Grains should be no more than a quarter of your plate. At least half of your grains should be whole grains.    Protein: This group includes meat, poultry, seafood, beans and peas, eggs, processed soy products (like tofu), nuts (including nut butters), and seeds. Make protein choices no more than a quarter of your plate. Meat and poultry choices should be lean or low fat.    Dairy: All fluid milk products and foods made from milk that contain calcium, like yogurt and cheese are part of the Dairy Group. (Foods that have little calcium, such as cream, butter, and cream cheese, are not part of the group.) Most dairy choices should be low-fat or fat-free.    Oils: These are fats that are liquid at room temperature. They include canola, corn, olive, soybean, and sunflower oil. Foods that are mainly oil include mayonnaise, certain salad dressings, and soft margarines. You should have only 5 to 7 teaspoons of oils a day. You probably already get this much from the food you eat.  Use [employer] to Help Build Your Meals  The [employer] can help you plan and track your meals and activity. You can look up individual foods to see or compare their nutritional value. You can get guidelines for what and how much you should eat. You can compare your food choices. And you can assess personal physical activities and see ways you can improve. Go to www.[employer] ([employer]).gov/[employer]/.    7620-8680 [employer]. [address] [place] [address]. All rights reserved. This information is not intended as a substitute for professional medical care. Always follow your healthcare professional's instructions.           Patient Education   Urinary Incontinence, Female (Adult)  Urinary incontinence means loss of control of the bladder. This problem affects many women, especially as they  get older. If you have incontinence, you may be embarrassed to ask for help. But know that this problem can be treated.  Types of Incontinence  There are different types of incontinence. Two of the main types are described here. You can have more than one type.    Stress incontinence. With this type, urine leaks when pressure (stress) is put on the bladder. This may happen when you cough, sneeze, or laugh. Stress incontinence most often occurs because the pelvic floor muscles that support the bladder and urethra are weak. This can happen after pregnancy and vaginal childbirth or a hysterectomy. It can also be due to excess body weight or hormone changes.    Urge incontinence (also called overactive bladder). With this type, a sudden urge to urinate is felt often. This may happen even though there may not be much urine in the bladder. The need to urinate often during the night is common. Urge incontinence most often occurs because of bladder spasms. This may be due to bladder irritation or infection. Damage to bladder nerves or pelvic muscles, constipation, and certain medicines can also lead to urge incontinence.  Treatment of urinary incontinence depends on the cause. Further evaluation is needed to find the type you have. This will likely include an exam and certain tests. Based on the results, you and your healthcare provider can then plan treatment. Until a diagnosis is made, the home care tips below can help relieve symptoms.  Home care    Do pelvic floor muscle exercises, if they are prescribed. The pelvic floor muscles help support the bladder and urethra. Many women find that their symptoms improve when doing special exercises that strengthen these muscles. To do the exercises contract the muscles you would use to stop your stream of urine, but do this when youre not urinating. Hold for 10 seconds, then relax. Repeat 10 to 20 times in a row, at least 3 times a day. Your provider may give you other  instructions for how to do the exercises and how often.    Keep a bladder diary. This helps track how often and how much you urinate over a set period of time. Bring this diary with you to your next visit with the provider. The information can help your provider learn more about your bladder problem.    Lose weight, if advised to by your provider. Excess weight puts pressure on the bladder. Your provider can help you create a weight-loss plan thats right for you. This may include exercising more and making certain diet changes.    Don't consume foods and drinks that may irritate the bladder. These can include alcohol and caffeinated drinks.    Quit smoking. Smoking and other tobacco use can lead to chronic cough that strains the pelvic floor muscles. Smoking may also damage the bladder and urethra. Talk with your provider about treatments or methods you can use to quit smoking.    If drinking large amounts of fluid causes you to have symptoms, you may be advised to limit your fluid intake. You may also be advised to drink most of your fluids during the day and to limit fluids at night.    If youre worried about urine leakage or accidents, you may wear absorbent pads to catch urine. Change the pads often. This helps reduce discomfort. It may also reduce the risk of skin or bladder infections.  Follow-up care  Follow up with your healthcare provider, or as directed. It may take some to find the right treatment for your problem. Your treatment plan may include special therapies or medicines. Certain procedures or surgery may also be options. Be sure to discuss any questions you have with your provider.  When to seek medical advice  Call the healthcare provider right away if any of these occur:    Fever of 100.4 F (38 C) or higher, or as directed by your provider    Bladder pain or fullness    Abdominal swelling    Nausea or vomiting    Back pain    Weakness, dizziness or fainting  Date Last Reviewed: 10/1/2017     5622-7740 [employer]. [address] [place] [address], [place] [address]. All rights reserved. This information is not intended as a substitute for professional medical care. Always follow your healthcare professional's instructions.       Advance Directive  Patients advance directive was discussed and I am comfortable with the patients wishes.  Patient Education   Personalized Prevention Plan  You are due for the preventive services outlined below.  Your care team is available to assist you in scheduling these services.  If you have already completed any of these items, please share that information with your care team to update in your medical record.  Health Maintenance   Topic Date Due   ? ZOSTER VACCINES (2 of 3) 01/03/2014   ? DXA SCAN  12/03/2020   ? MAMMOGRAM  02/13/2021   ? MEDICARE ANNUAL WELLNESS VISIT  10/14/2021   ? FALL RISK ASSESSMENT  10/14/2021   ? ADVANCE CARE PLANNING  11/13/2023   ? LIPID  11/28/2023   ? COLORECTAL CANCER SCREENING  08/23/2024   ? TD 18+ HE  09/04/2028   ? HEPATITIS C SCREENING  Completed   ? Pneumococcal Vaccine: 65+ Years  Completed   ? INFLUENZA VACCINE RULE BASED  Completed   ? Pneumococcal Vaccine: Pediatrics (0 to 5 Years) and At-Risk Patients (6 to 64 Years)  Aged Out   ? HEPATITIS B VACCINES  Aged Out

## 2021-06-20 NOTE — PROGRESS NOTES
Subjective:      Patient ID: Ivon Newell is a 68 y.o. female.    Chief Complaint:    HPI Ivon Newell is a 68 y.o. female who presents today complaining of dysuria x 1 day. Patient also noted an odor and cloudiness over the past few days. She thought that the urine had a pink hue this morning. She denies any fevers, nausea, flank pain. She felt a little shaky this morning. She has had a few UTI's in her past, but none recently. Patient denies any personal hx of DM.       Social History   Substance Use Topics     Smoking status: Never Smoker     Smokeless tobacco: Never Used     Alcohol use 1.8 oz/week     3 Glasses of wine per week       Review of Systems   Constitutional: Negative for fever.   Gastrointestinal: Negative for nausea and vomiting.   Genitourinary: Positive for dysuria, frequency and hematuria. Negative for flank pain, urgency, vaginal bleeding, vaginal discharge and vaginal pain.       Objective:     /65 (Patient Site: Right Arm, Patient Position: Sitting, Cuff Size: Adult Regular)  Pulse 99  Temp 97.8  F (36.6  C) (Oral)   Resp 18  Wt 153 lb 9.6 oz (69.7 kg)  SpO2 100%  BMI 25.56 kg/m2    Physical Exam   Constitutional: She appears well-developed and well-nourished. No distress.   HENT:   Head: Normocephalic and atraumatic.   Right Ear: External ear normal.   Left Ear: External ear normal.   Eyes: Conjunctivae are normal.   Pulmonary/Chest: Effort normal. No respiratory distress.   Abdominal: Soft. She exhibits no distension. There is tenderness (Mild) in the suprapubic area. There is no rebound, no guarding and no CVA tenderness.   Skin: She is not diaphoretic.   Psychiatric: She has a normal mood and affect. Her behavior is normal. Judgment and thought content normal.   Nursing note and vitals reviewed.    Labs:  Recent Results (from the past 24 hour(s))   Urinalysis-UC if Indicated   Result Value Ref Range    Color, UA Yellow Colorless, Yellow, Straw, Light Yellow    Clarity, UA  Slightly Cloudy (!) Clear    Glucose, UA Negative Negative    Bilirubin, UA Negative Negative    Ketones, UA Negative Negative    Specific Gravity, UA 1.010 1.005 - 1.030    Blood, UA Large (!) Negative    pH, UA 7.0 5.0 - 8.0    Protein, UA Negative Negative mg/dL    Urobilinogen, UA 0.2 E.U./dL 0.2 E.U./dL, 1.0 E.U./dL    Nitrite, UA Positive (!) Negative    Leukocytes, UA Moderate (!) Negative    Bacteria, UA Many (!) None Seen hpf    RBC, UA 0-2 None Seen, 0-2 hpf    WBC, UA 25-50 (!) None Seen, 0-5 hpf    Squam Epithel, UA 0-5 None Seen, 0-5 lpf     Clinical Decision Making:  UA indicative of UTI today.  Patient's UTI is uncomplicated as she is not exhibiting any signs of pyelonephritis and has no underlying conditions that put her at risk for complication.  Patient was started on Macrobid twice daily ×5 days.  Urine culture and susceptibilities are pending.    Assessment:     Procedures    1. Urinary tract infection  nitrofurantoin, macrocrystal-monohydrate, (MACROBID) 100 MG capsule   2. Urinary frequency  Urinalysis-UC if Indicated    Culture, Urine         Patient Instructions   1. Increase fluid intake  2. Complete antibiotic regimen as prescribed. You will be notified if the treatment plan needs to be changed based on the urine culture results.   3. Follow if you have a fever of 100.4 F (38 C) or higher, no improvement after three days of treatment, trouble urinating because of pain,new or increased discharge from the vagina, rash or joint pain, Increased back or abdominal pain.

## 2021-06-20 NOTE — PROGRESS NOTES
Clinic Note    Assessment:     Assessment and Plan:  1. Encounter for removal of sutures  We were able to remove her sutures without complication today.  Her wound is well approximated, dry, without signs of infection.  The incision was covered with 2 Band-Aids and a protective splint was applied before she left today.  See patient instructions below.    2. Sicca syndrome (H)  Stable.  She does not use medications for this.  She only has mildly dry eyes and mouth.  This is not overly problematic for her.     Patient Instructions   Your finger wound looks good today.  No signs of infection.    Keep the wound covered and protected.    If you notice any complications, if the incision seems like it is about 2 separate, if there is any worsening discharge or bleeding, come back to see me.    No Follow-up on file.         Subjective:      Patient comes the clinic today for removal of sutures.    I last saw the patient 4 days ago.  At that time we attempted to remove 9 sutures from her left index finger, however, her wound appeared as though it were about to dehisce.  I instructed her to keep the wound dry for the next few days and to come back to the clinic for wound check so that we could potentially remove the stitches.    Today, she states that she has not had any complications from the wound since we last saw her.  She has kept the wound well protected.      The following portions of the patient's history were reviewed and updated as appropriate: Allergies, medications, problems, prior note.    Review of Systems:    Review is otherwise negative except for what is mentioned above.     Social Hx:    History   Smoking Status     Never Smoker   Smokeless Tobacco     Never Used         Objective:     Vitals:    09/14/18 0759   BP: 116/76   Pulse: 83   SpO2: 100%   Weight: 153 lb (69.4 kg)       Exam:    General: No apparent distress. Calm. Alert and Oriented X3. Pt behavior is appropriate.  Skin: The remaining 6 stitches  were removed without complication.  Wound is well approximated.  No bleeding or bruising.  No signs of infection.      Patient Active Problem List   Diagnosis     Hyperlipemia     Osteopenia     Sjogren's Syndrome     Anxiety     Psychogenic cough     Current Outpatient Prescriptions   Medication Sig Dispense Refill     aspirin 81 MG EC tablet Take 81 mg by mouth daily.       MULTIVITAMIN (MULTIPLE VITAMINS ORAL) Take by mouth.       PARoxetine (PAXIL) 20 MG tablet Take 1 tablet (20 mg total) by mouth daily. 90 tablet 1     polyethylene glycol (MIRALAX) 17 gram packet        simvastatin (ZOCOR) 20 MG tablet Take 1 tablet (20 mg total) by mouth daily. 90 tablet 2     No current facility-administered medications for this visit.        I spent 15 minutes with patient face to face, of which >50% was counseling regarding the above plan       Cain Henriquez (Rob), JORGE    9/14/2018

## 2021-06-20 NOTE — PROGRESS NOTES
Clinic Note    Assessment:     Assessment and Plan:  1. Visit for suture removal  I was able to remove 3 stitches from her finger today before her wound abruptly began to show signs of dehiscence.  We decided that we would have the patient keep the remaining 6 stitches in place and have her follow-up with me at the end of this week for a wound check.  We will remove the remaining stitches at that time if necessary.  Patient agrees with plan of care.     Patient Instructions   We removed 3 stitches from your finger today.    After taking out the third stitch, your wound appeared like it was about to dehisce.    To avoid a complete dehiscence of your wound, we are going to leave the stitches in for a few more days.    We are going to have you schedule appointment to come back and see me on Friday.    In the meantime, you can place some Vaseline on the wound today.  This will be the Last time you place Vaseline on the wound until I see you on Friday.    Make sure to wear your finger brace to protect the wound.    No Follow-up on file.         Subjective:      Patient comes to the clinic today for suture removal.    Patient was seen at our walk-in clinic 1 week ago after suffering a laceration on her left index finger.  She was using a heavy duty sewing machine to do some quilting when she caught her finger in the machine.  She abruptly tried to remove her finger which resulted in a significant laceration.  At walk-in clinic, she had 9 stitches placed.  She was told to use Vaseline (she is allergic to bacitracin) on the wound and she was given a protective splint to keep the distal portion of her finger from becoming dehisced.    Patient today states that she has not had any complications with her wound since having the sutures placed.  No pain.  She is nervous about having stitches removed.    The following portions of the patient's history were reviewed and updated as appropriate: Allergies, medications, problems,  prior note.    Review of Systems:    Review is otherwise negative except for what is mentioned above.     Social Hx:    History   Smoking Status     Never Smoker   Smokeless Tobacco     Never Used         Objective:     Vitals:    09/11/18 0807   BP: 125/80   Pulse: 88   SpO2: 99%   Weight: 152 lb (68.9 kg)       Exam:    General: No apparent distress. Calm. Alert and Oriented X3. Pt behavior is appropriate.  Skin: distal portion of the patient's left index finger: 9 stitches meticulously placed across an irregular laceration.  Laceration is well approximated.  3 stitches were removed starting at the proximal portion of the laceration.  The medial portion of the laceration began to dehisce after the third stitch was removed.  Minimal bleeding.  No purulent material.  No streaking.  No signs of infection.      Patient Active Problem List   Diagnosis     Hyperlipemia     Osteopenia     Sjogren's Syndrome     Anxiety     Psychogenic cough     Current Outpatient Prescriptions   Medication Sig Dispense Refill     aspirin 81 MG EC tablet Take 81 mg by mouth daily.       MULTIVITAMIN (MULTIPLE VITAMINS ORAL) Take by mouth.       PARoxetine (PAXIL) 20 MG tablet Take 1 tablet (20 mg total) by mouth daily. 90 tablet 1     polyethylene glycol (MIRALAX) 17 gram packet        simvastatin (ZOCOR) 20 MG tablet Take 1 tablet (20 mg total) by mouth daily. 90 tablet 2     No current facility-administered medications for this visit.        I spent 20 minutes with patient face to face, of which >50% was counseling regarding the above plan       Cain Henriquez (Rob), JORGE    9/11/2018

## 2021-06-20 NOTE — PROGRESS NOTES
Assessment and Plan     Ivon was seen today for poss cut left finger.    Diagnoses and all orders for this visit:    Laceration of left index finger without foreign body without damage to nail, initial encounter  -     lidocaine 10 mg/mL (1 %) injection 1 mL; 1 mL by Infiltration route once.       HPI     Chief Complaint   Patient presents with     poss cut left finger     xToday Cut left finger        Ivon Newell is a 68 y.o. female seen today for a laceration of her left index finger.  She was using a heavy duty sewing machine to do some quilting when her finger was injured.  She is not sure exactly which part of the machine it was caught in.        Current Outpatient Prescriptions:      aspirin 81 MG EC tablet, Take 81 mg by mouth daily., Disp: , Rfl:      MULTIVITAMIN (MULTIPLE VITAMINS ORAL), Take by mouth., Disp: , Rfl:      PARoxetine (PAXIL) 20 MG tablet, Take 1 tablet (20 mg total) by mouth daily., Disp: 90 tablet, Rfl: 1     polyethylene glycol (MIRALAX) 17 gram packet, , Disp: , Rfl:      simvastatin (ZOCOR) 20 MG tablet, Take 1 tablet (20 mg total) by mouth daily., Disp: 90 tablet, Rfl: 2    Current Facility-Administered Medications:      lidocaine 10 mg/mL (1 %) injection 1 mL, 1 mL, Infiltration, Once, Roly Narayan PA-C     Reviewed and updated: medical history, medications and allergies.     Review of Systems     General: Denies fever, chills, fatigue.  Skin: Injury to left index finger.     Objective     Vitals:    09/04/18 1302   BP: 127/62   Patient Site: Right Arm   Patient Position: Sitting   Cuff Size: Adult Regular   Pulse: 87   Resp: 18   Temp: 97  F (36.1  C)   TempSrc: Oral   SpO2: 100%   Weight: 152 lb (68.9 kg)        Reviewed vital signs.  General: Appears calm, comfortable. Answers questions quickly and appropriately with clear speech. No apparent distress.  Skin: Pink, warm, dry.  Laceration to the pad of left index finger.  Nail is intact.  No foreign bodies noted.  HENT:  Normocephalic, atraumatic.  Neck: Supple.  Cardiovascular: Strong, regular radial pulse.  Respiratory: Normal respiratory effort.  Neuro: Memory and cognition appear normal. Normal gait.  Psych: Mood and affect appear normal.     Imaging:   No results found.    Labs:  No results found for this or any previous visit (from the past 24 hour(s)).     Medical Decision-Making     Ivon is a well-appearing 68-year-old female presents with a laceration to the pad of her left index finger that was sustained when her finger got caught in her sewing machine.  She denies any other pain or injury.  Laceration is rather jagged with a flap and extends from the hyponychium, up distal portion of the fingertip and then across the pad of the finger towards the radial edge of the finger.  There is a devitalized flap towards the radial edge.  No other evidence of injury.  She is able to move her finger well at all joints.  Flexor and extensor tendon function is intact.  She has good sensation out to the tip of the finger.    Reviewed red flags that would trigger a prompt return to the clinic as noted below under patient instructions.  She expressed understanding of these directions and is in agreement with the plan.       Procedure     Approximately 3 mL's of 1% lidocaine without epinephrine were infused to perform a digital block.  The finger was then washed thoroughly for 7-10 minutes under running tap water.  The skin surrounding the wound was then cleansed in the usual fashion using povidone iodine swabs.  The majority of the wound was repaired using simple interrupted sutures with 5-0 Ethilon.  The devitalized flap was tacked in place with 2 simple interrupted sutures and a small amount of Dermabond.  A total of 11 simple interrupted sutures were placed.  Excellent wound approximation was achieved.  Procedure was well-tolerated by the patient.  Dressed with sterile gauze and applied a splint to protect the fingertip.  Reviewed  appropriate wound management with the patient.     Patient Instructions     Patient Instructions   1. Get sutures removed in 7-10 days  2. Keep water off of wound for 48 hours, after that you can gently wash and dry the area  3. No need for any antibiotic ointment after you removed the dressing in 48 hours, just keep area clean. You can use a dab of vaseline to keep it moist.  4. Call clinic if you see any signs of infection: fever, pus, bleeding that won't stop with 5 minutes of pressure, or increased pain not improved with either tylenol or ibuprofen  5. Call clinic with any other questions    What are stitches? -- Stitches are a way doctors can close certain types of cuts. A doctor uses a special needle and thread to put in stitches. He or she sews the edges of the cut together and ties knots to hold the stitches in place. The term doctors use for stitches is  sutures.   There are 2 main types of stitches:  ?Absorbable - These stitches dissolve over time. They do not need to be taken out.  ?Nonabsorbable - These stitches need to be taken out after a certain amount of time. They do not dissolve.  Minor cuts and scrapes that do not go all the way through the skin do not need stitches. If you get a cut and don t know if you need stitches, check with your doctor or nurse.  What happens when I get stitches? -- Before the doctor stitches  your cut, he or she will clean out the cut well. He or she will also give you numbing medicine so that you don t feel pain when the stitches go in.  After the doctor stitches your cut, he or she will cover the area with gauze or a bandage.  Why is it important to take care of my stitches? -- It s important to take care of your stitches so that your cut heals well and doesn t get infected.   How do I take care of my stitches? -- Your doctor or nurse will give you specific instructions, depending on the type of stitches you have and where they are.   Here is some general advice you can  follow:  ?Keep your stitches dry and covered with a bandage. Nonabsorbable stitches need to be kept dry for 1 to 2 days. Absorbable stitches need to be kept dry longer. Your doctor or nurse will tell you exactly how long to keep your stitches dry.  ?Once you no longer need to keep your stitches dry, gently wash them with soap and water whenever you take a shower. Do not put your stitches underwater, such as in a bath, pool, or lake. Getting your stitches too wet can slow down healing and raise your chance of getting an infection.  ?After you wash your stitches, pat them dry and put an antibiotic ointment on them.  ?Cover your stitches with a bandage or gauze, unless your doctor or nurse tells you not to.  ?Avoid any activities or sports that could hurt the area of your stitches for 1 to 2 weeks. (Your doctor or nurse will tell you exactly how long to avoid these activities.) If you hurt the same part of your body again, your stitches can break, and the cut can open up again.   When should I call the doctor or nurse? -- Call your doctor or nurse if:  ?Your stitches break or the cut opens up again.  ?You get a fever.  ?You have redness or swelling around the cut, or pus drains from the cut. It is normal for clear yellow fluid to drain from the cut in the first few days.  When will my stitches or staples be taken out? -- The doctor who puts in the stitches or staples will tell you when to see your doctor or nurse to have them taken out. Nonabsorbable stitches usually stay in for 5 to 14 days, depending on where they are.  What should I do after my stitches are out? -- After your stitches or staples are out, you should protect the scar from the sun. Use sunscreen on the area or wear clothes or a hat that covers the scar.   Your doctor or nurse might also recommend that you use certain lotions or creams to help your scar heal.    Suture removal -- The timing of suture removal varies with the anatomic site:  ?Eyelids -  Three days  ?Neck - Three to four days  ?Face - Five days  ?Scalp - 7 to 14 days  ?Trunk and upper extremities - Seven days  ?Lower extremities - 8 to 10 days        Discussed benefit vs risk of medications, dosing, side effects.  Patient was able to verbalize understanding.  After visit summary was provided for patient.     Roly Narayan PA-C

## 2021-06-21 NOTE — PROGRESS NOTES
Assessment and Plan:       1. Routine health maintenance    - Comprehensive Metabolic Panel; Future  - HM2(CBC w/o Differential); Future  - Urinalysis; Future  - Pneumococcal polysaccharide vaccine 23-valent greater than or equal to 1yo subcutaneous/IM  - Mammo Screening Bilateral; Future    2. Hyperlipemia  On statin  - Lipid Profile; Future    3. Osteopenia  Due for DXA  - Vitamin D, Total (25-Hydroxy); Future  - DXA Bone Density Scan    4. Routine general medical examination at a health care facility      5. Anxiety  On Paxil, stable    6. Sjogren's Syndrome  Stable, no significant symptoms    7. Encounter for screening mammogram for malignant neoplasm of breast     - Mammo Screening Bilateral; Future    8. Mixed hearing loss, bilateral  Noticed over the last year. Her mother had bilat hearing loss in her 60s.  - Ambulatory referral to Audiology     The patient's current medical problems were reviewed.    I have had an Advance Directives discussion with the patient.  The following health maintenance schedule was reviewed with the patient and provided in printed form in the after visit summary:   Health Maintenance   Topic Date Due     ZOSTER VACCINES (2 of 3) 01/03/2014     PNEUMOCOCCAL POLYSACCHARIDE VACCINE AGE 65 AND OVER  01/19/2017     DXA SCAN  04/04/2018     MAMMOGRAM  06/02/2018     COLONOSCOPY  06/25/2019     FALL RISK ASSESSMENT  09/11/2019     ADVANCE DIRECTIVES DISCUSSED WITH PATIENT  01/15/2021     TD 18+ HE  09/04/2028     INFLUENZA VACCINE RULE BASED  Completed     PNEUMOCOCCAL CONJUGATE VACCINE FOR ADULTS (PCV13 OR PREVNAR)  Completed        Subjective:   Chief Complaint: Ivon Newell is an 68 y.o. female here for an Annual Wellness visit.   HPI:  Acute and chronic issues discussed as above.    Review of Systems:    Please see above.  The rest of the review of systems are negative for all systems.    Patient Care Team:  Errol Henderson MD as PCP - General (Internal Medicine)     Patient Active  Problem List   Diagnosis     Hyperlipemia     Osteopenia     Sjogren's Syndrome     Anxiety     Psychogenic cough     No past medical history on file.   Past Surgical History:   Procedure Laterality Date     BREAST CYST ASPIRATION      about 12 to 13 years ago     HYSTERECTOMY       [place] TOTAL ABDOM HYSTERECTOMY      Description: Total Abdominal Hysterectomy;  Recorded: 07/20/2012;     [place] TOTAL ABDOM HYSTERECTOMY      Description: Hysterectomy;  Proc Date: 01/01/1996;     IL VAGINAL HYSTERECTOMY,UTERUS 250 GMS/<      Description: Vaginal Hysterectomy;  Recorded: 11/08/2013;      Family History   Problem Relation Age of Onset     Colon cancer Maternal Grandmother      Breast cancer Maternal Aunt      Hyperlipidemia Mother      Dementia Mother      Diabetes Father      Heart disease Father      Stroke Father      Celiac disease Daughter      Hypertension Sister       Social History     Socioeconomic History     Marital status:      Spouse name: Not on file     Number of children: Not on file     Years of education: Not on file     Highest education level: Not on file   Social Needs     Financial resource strain: Not on file     Food insecurity - worry: Not on file     Food insecurity - inability: Not on file     Transportation needs - medical: Not on file     Transportation needs - non-medical: Not on file   Occupational History     Not on file   Tobacco Use     Smoking status: Never Smoker     Smokeless tobacco: Never Used   Substance and Sexual Activity     Alcohol use: Yes     Alcohol/week: 1.8 oz     Types: 3 Glasses of wine per week     Drug use: No     Sexual activity: No   Other Topics Concern     Not on file   Social History Narrative     Not on file      Current Outpatient Medications   Medication Sig Dispense Refill     aspirin 81 MG EC tablet Take 81 mg by mouth daily.       MULTIVITAMIN (MULTIPLE VITAMINS ORAL) Take by mouth.       PARoxetine (PAXIL) 20 MG tablet Take 1 tablet (20 mg total) by mouth  "daily. 90 tablet 3     polyethylene glycol (MIRALAX) 17 gram packet Take 17 g by mouth daily as needed .             simvastatin (ZOCOR) 20 MG tablet Take 1 tablet (20 mg total) by mouth daily. 90 tablet 2     No current facility-administered medications for this visit.       Objective:   Vital Signs:   Visit Vitals  /68 (Patient Site: Right Arm, Patient Position: Sitting, Cuff Size: Adult Regular)   Pulse 84   Ht 5' 4.5\" (1.638 m)   Wt 149 lb 4.8 oz (67.7 kg)   BMI 25.23 kg/m         VisionScreening:  No exam data present     PHYSICAL EXAM  General Appearance: Alert, cooperative, no distress, appears stated age.  Head: Normocephalic, without obvious abnormality, atraumatic  Eyes: PERRL, conjunctiva/corneas clear, EOM's intact  Ears: Normal TM's and external ear canals, both ears  Nose: Nares normal, septum midline,mucosa normal, no drainage  Throat: Lips, mucosa, and tongue normal; teeth and gums normal  Neck: Supple, symmetrical, trachea midline, no adenopathy;  thyroid: not enlarged, symmetric, no tenderness/mass/nodules; no carotid bruit or JVD  Back: Symmetric, no curvature, ROM normal, no CVA tenderness.  Lungs: Clear to auscultation bilaterally, respirations unlabored.  Breasts: No breast masses, tenderness, asymmetry, or nipple discharge.  Heart: Regular rate and rhythm, S1 and S2 normal, no murmur, rub, or gallop.  Abdomen: Soft, non-tender, bowel sounds active all four quadrants,  no masses, no organomegaly.  Pelvic:declined  Extremities: Extremities normal, atraumatic, no cyanosis or edema.  Skin: Skin color, texture, turgor normal, no rashes or lesions.  Lymph nodes: Cervical, supraclavicular, and axillary nodes normal.  Neurologic: No focal neurological findings.      Assessment Results 11/13/2018   Activities of Daily Living No help needed   Instrumental Activities of Daily Living No help needed   Get Up and Go Score Less than 12 seconds   Mini Cog Total Score 4   Some recent data might be hidden "     A Mini-Cog score of 0-2 suggests the possibility of dementia, score of 3-5 suggests no dementia    Identified Health Risks:     Information on urinary incontinence and treatment options given to patient.  She is at risk for falling and has been provided with information to reduce the risk of falling at home.  Patient's advanced directive was discussed and I am comfortable with the patient's wishes.

## 2021-06-22 NOTE — PROGRESS NOTES
"Audiology only; referred by Errol Henderson    History:  Patient reported she recently \"lost her hearing completely\" in the left ear for approximately 48 hours; she had had URI prior to this happening, and the decrease in hearing did resolve on its own. Her mother lost her hearing in both ears while in her 60s, and she is concerned something similar may be happening to her. She thought she may have cerumen occlusions, and was using Debrox at home; however, PCP noted clear canals on 11-13-18. She denied tinnitus, dizziness, otalgia, otorrhea, aural fullness, or history of significant noise exposure.    Results:  Otoscopy revealed near complete cerumen occlusions, bilaterally.  Hearing testing was deferred until cerumen can be safely removed by a physician.      Recommendations:  Follow-up with ENT for cerumen removal (scheduled with Andres De Souza MD, on 12-12-18). Ms. Newell declined to reschedule hearing testing at this time, but will do so if hearing is not restored to baseline after cerumen removal. Ms. Newell expressed verbal understanding of this information and plan. No charge appointment today.    Lobito Baker, [facility]  [place] Licensed Audiologist [id number]    "

## 2021-06-22 NOTE — PROGRESS NOTES
HISTORY OF PRESENT ILLNESS  Asked to see by Trista Esquivel for removal of cerumen. Patient reports that she had decreased hearing and was scheduled to have a hearing test. During that evaluation she was found to have bilateral cerumen impaction. She is here today for removal of cerumen.    REVIEW OF SYSTEMS  Review of Systems: a 10-system review was performed. Pertinent positives are noted in the HPI and on a separate scanned document in the chart.    PMH, PSH, FH and SH has documented in the EHR.      EXAM    CONSTITUTIONAL  General Appearance:  Normal, well developed, well nourished, no obvious distress  Ability to Communicate:  communicates appropriately.    HEAD AND FACE  Appearance and Symmetry:  Normal, no scalp or facial scarring or suspicious lesions.    EARS  Clinical speech reception threshold:  Normal, able to hear normal speech.  Auricle:  Normal, Auricles without scars, lesions, masses.  External auditory canal:  Bilateral cerumen impaction. Both ears were debrided under otomicroscopy using microdissection technique.  Tympanic membrane:  Normal, Tympanic membranes normal without swelling or erythema.    NOSE (speculum or scope)  Architecture:  Normal, Grossly normal external nasal architecture with no masses or lesions.    NEUROLOGICAL  Speech pattern:  Normal, Proasaic    RESPIRATORY  Symmetry and Respiratory effort:  Normal, Symmetric chest movement and expansion with no increased intercostal retractions or use of accessory muscles.     IMPRESSION  Bilateral cerumen impaction    RECOMMENDATION  Follow up as needed.    Christiano De Souza MD

## 2021-06-24 NOTE — TELEPHONE ENCOUNTER
Refill Approved    Rx renewed per Medication Renewal Policy. Medication was last renewed on 5/18/18.    Ov: 11/13/18    Dee Singh, [facility] Triage/Med Refill 2/23/2019     Requested Prescriptions   Pending Prescriptions Disp Refills     simvastatin (ZOCOR) 20 MG tablet 90 tablet 2     Sig: Take 1 tablet (20 mg total) by mouth daily.    Statins Refill Protocol (Hmg CoA Reductase Inhibitors) Passed - 2/23/2019  1:05 PM       Passed - PCP or prescribing provider visit in past 12 months     Last office visit with prescriber/PCP: Visit date not found OR same dept: 9/14/2018 Cain Henriquez CNP OR same specialty: 9/14/2018 Cain Henriquez CNP  Last physical: 11/13/2018 Last MTM visit: Visit date not found   Next visit within 3 mo: Visit date not found  Next physical within 3 mo: Visit date not found  Prescriber OR PCP: Errol Henderson MD  Last diagnosis associated with med order: 1. Hyperlipidemia  - simvastatin (ZOCOR) 20 MG tablet; Take 1 tablet (20 mg total) by mouth daily.  Dispense: 90 tablet; Refill: 2    If protocol passes may refill for 12 months if within 3 months of last provider visit (or a total of 15 months).

## 2021-06-27 NOTE — PROGRESS NOTES
"Progress Notes by Trista Esquivel AuD at 4/2/2019 10:30 AM     Author: Trista Esquivel AuD Service: -- Author Type: Audiologist    Filed: 4/2/2019 11:36 AM Encounter Date: 4/2/2019 Status: Signed    : Trista Esquivel AuD (Audiologist)       Audiology only; referred by Errol Henderson    Summary:  Audiology visit completed. Please see audiogram below or under \"media\" tab for history and results.    Transducer:  Both insert phones and circumaural headphones were used.      Recommendations:  Follow-up with ENT (scheduled with [facility] 4-16-19); retest hearing annually (to monitor) or per medical management.  Wear hearing protection consistently in noise to preserve residual hearing sensitivity.  Ivon Newell is not an amplification candidate at this time in either ear.    Lobito Baker, [facility]  [place] Licensed Audiologist [id number]           "

## 2021-07-03 NOTE — ADDENDUM NOTE
Addendum Note by Linwood Henderson MD at 10/14/2020  8:00 AM     Author: Linwood Henderson MD Service: -- Author Type: Physician    Filed: 10/14/2020  4:36 PM Encounter Date: 10/14/2020 Status: Signed    : Linwood Henderson MD (Physician)    Addended by: LINWOOD HENDERSON on: 10/14/2020 04:36 PM        Modules accepted: Orders

## 2021-07-03 NOTE — ADDENDUM NOTE
Addendum Note by Linwood Henderson MD at 10/14/2020  8:00 AM     Author: Linwood Henderson MD Service: -- Author Type: Physician    Filed: 10/15/2020  8:01 AM Encounter Date: 10/14/2020 Status: Signed    : Linwood Henderson MD (Physician)    Addended by: LINWOOD HEDNERSON on: 10/15/2020 08:01 AM        Modules accepted: Orders

## 2021-07-13 ENCOUNTER — RECORDS - HEALTHEAST (OUTPATIENT)
Dept: ADMINISTRATIVE | Facility: CLINIC | Age: 71
End: 2021-07-13

## 2021-07-21 ENCOUNTER — RECORDS - HEALTHEAST (OUTPATIENT)
Dept: ADMINISTRATIVE | Facility: CLINIC | Age: 71
End: 2021-07-21

## 2021-07-22 ENCOUNTER — RECORDS - HEALTHEAST (OUTPATIENT)
Dept: INTERNAL MEDICINE | Facility: CLINIC | Age: 71
End: 2021-07-22

## 2021-07-22 DIAGNOSIS — Z12.31 OTHER SCREENING MAMMOGRAM: ICD-10-CM

## 2021-09-22 ENCOUNTER — LAB (OUTPATIENT)
Dept: LAB | Facility: CLINIC | Age: 71
End: 2021-09-22
Payer: COMMERCIAL

## 2021-09-22 DIAGNOSIS — E05.90 SUBCLINICAL HYPERTHYROIDISM: ICD-10-CM

## 2021-09-22 LAB
T3 SERPL-MCNC: 90 NG/DL (ref 60–181)
T4 FREE SERPL-MCNC: 0.78 NG/DL (ref 0.7–1.8)
TSH SERPL DL<=0.005 MIU/L-ACNC: 3.84 UIU/ML (ref 0.3–5)

## 2021-09-22 PROCEDURE — 36415 COLL VENOUS BLD VENIPUNCTURE: CPT

## 2021-09-22 PROCEDURE — 84439 ASSAY OF FREE THYROXINE: CPT

## 2021-09-22 PROCEDURE — 84480 ASSAY TRIIODOTHYRONINE (T3): CPT

## 2021-09-22 PROCEDURE — 84443 ASSAY THYROID STIM HORMONE: CPT

## 2021-09-24 ASSESSMENT — ENCOUNTER SYMPTOMS
SLEEP DISTURBANCES DUE TO BREATHING: 0
LIGHT-HEADEDNESS: 0
SYNCOPE: 0
HYPOTENSION: 0
EXERCISE INTOLERANCE: 0
PALPITATIONS: 0
ORTHOPNEA: 0
HYPERTENSION: 0
LEG PAIN: 0

## 2021-09-27 ENCOUNTER — VIRTUAL VISIT (OUTPATIENT)
Dept: ENDOCRINOLOGY | Facility: CLINIC | Age: 71
End: 2021-09-27
Payer: COMMERCIAL

## 2021-09-27 DIAGNOSIS — Z86.39 HISTORY OF GRAVES' DISEASE: Primary | ICD-10-CM

## 2021-09-27 DIAGNOSIS — M85.9 LOW BONE DENSITY: ICD-10-CM

## 2021-09-27 PROCEDURE — 99214 OFFICE O/P EST MOD 30 MIN: CPT | Mod: 95

## 2021-09-27 NOTE — LETTER
9/27/2021       RE: Ivon Newell  [address] [place] [id number]     Dear Colleague,    Thank you for referring your patient, Ivon Newell, to the [facility] [place] at [facility]. Please see a copy of my visit note below.    Endocrine  Video visit note-     Attending Assessment/Plan :     History of Graves' Hyperthyroidism , subclinical, She took MMI 11/2020 to 3/2021.   Multiple labs since then have been normal. We haven't repeated the TSI.  She may be at risk for recurrence anytime in her life.   Suggest yearly TSH reflex or TFTS sooner as indicated by symptoms.   Graduate back to her primary care provider, Dr Henderson - return to endocrine if needed     Left nodule 0.4 x 0.2 x 0.3 . Recommend repeat US could be performed 11/2022 .  IF stable, then next 5 years later.       Sjogren syndrome in Ivon.  Family history of autoimune diseases. Suggested artificial tears eye drops    Low bone density - Bone gain on recent DXA compared with 2018.   Next DXA could be 4/15/2023 or thereafter     Post menopausal -    Due to the COVID 19 pandemic this visit was a video visit. The patient gave verbal consent for the visit today.    I have independently reviewed and interpreted labs, imaging as indicated.     Chart review/prep time 1  [id number]  Visit Start time 1359  Visit Stop time 1407  _30_ minutes spent on the date of the encounter doing chart review, history and exam, documentation and further activities as noted above.      Christiane Montero MD    Chief complaint/ HISTORY OF PRESENT ILLNESS    Ivon returns for follow up of subclinical hyperthyroidism.I started her on methimazole 5 mg/day on 11/11/2020.   Labs  have shown progressive improvement. I last saw her 2/15/2021.  At that time we reduced MMI from 5 mg/day to 2.5 mg/day.  On 3/17/2021 she had TSH 8.21, free T4 0.7, T3 68.  I told her to stop the MMI. She has now been off MMI  over 6 months.  TFTS on 9/22/2021 remain normal without treatment.     We have the following labs:   9/25/09 TSH 2.24  1/9/15 TSH 2.43  1/23/15 RF 35.8 (H), DOMINIQUE 6.7 (H)< SSA /Ro 183 (H)< SSB/La 37 (H)  1/15/16 TSH 2.3  10/14/2020 TSH < 0.01, free T4 1.1, T3 126, CAROLINA 42.7, .7, TRAB 1.53 25OHD 34.5, Ca 10  11/11/2020 TSI 2.93, TSH < 0.01, free T4 1.1, T3 126  12/16/2020 TSH 0.097, free T4 0.8, T3 63 on MMI 5 mg/day  1/13/2021 TSH 2.86, free T4 0.7, T3 71 on MMI 5 mg/day  2/10/2021 TSH 4.95, free T4 0.7, T3 72 on MMI 5 mg/day  3/17/2021 TSH 8.21, free T4 0.7, T3 68- on MMI 2.5 mg/day.  After this I told her to stop the MMI  3/29/2021 TSh 4.67, free T4 0.8, T3 76  4/28/2021 TSH 3.59, T3 87  9/22/2021 TSH 3.84, free T4 0.78, T3 90    Weights  10/14/2020 147 #   4/22/2020 158  5/20/19 156    10/20/2020 123I thyorid uptake/scan [facility] 24 hour uptake 28.3-- I have reviewed the imges on pACS - they show diffuse uptake  Left > right .     11/2/2020 thyroid US:   Thyroid normal size, slightly heterogeneous, grade 3 blood flow on some images, grade 1 on image with CCA (presumably gain is turned way down)  Right inferior nodule 0.3 x 0.2 x 0.3 cm hypoechoic  Left nodule 0.4 x 0.2 x 0.3     4/15/2021 DXA, as read by me: lowest T-score -1.4  Left femoral neck , right femoral neck   L spine 1.071 was 1.051  Left total hip 0.845 was 0.826  Right total hp 0.844 was 9.796      REVIEW OF SYSTEMS  Feeling normal     Answers for HPI/ROS submitted by the patient on 9/24/2021  General Symptoms: No  Skin Symptoms: No  HENT Symptoms: No  EYE SYMPTOMS: No-- saw eye doctor about 6 weeks ago; eyes are a little dry; She does not use eye drops.   -HEART SYMPTOMS: Yes-- very erratic and infrequent - irregular bouts of 10 seconds pounding out of chest. This last occurred last week - occurring when reading a book It doesn't happen in bed at night;   LUNG SYMPTOMS: No; exercise tolerance is good.   INTESTINAL SYMPTOMS: No  URINARY  "SYMPTOMS: No  GYNECOLOGIC SYMPTOMS: No  BREAST SYMPTOMS: No  SKELETAL SYMPTOMS: No  BLOOD SYMPTOMS: No  NERVOUS SYSTEM SYMPTOMS: No  MENTAL HEALTH SYMPTOMS: No  Chest pain or pressure: No  Fast or irregular heartbeat: No  Pain in legs with walking: No  Trouble breathing while lying down: No  Fingers or toes appear blue: No  High blood pressure: No  Low blood pressure: No  Fainting: No  Murmurs: No  Pacemaker: No  Varicose veins: No  Edema or swelling: No  Wake up at night with shortness of breath: No  Light-headedness: No  Exercise intolerance: No    Had \":cellulitis:\" (self diagnosis)  in foot x 10 days in mid July - onset started stepping on a stinger wearing Birkenstocks.  She did not take antibiotics.  She soaked it       Past Medical History  Past Medical History:   Diagnosis Date     Anxiety      Chronic cough      Hyperlipidemia      Low bone density      Sjogren's syndrome (H)      Past Surgical History:   Procedure Laterality Date     BREAST CYST ASPIRATION      While ago     C TOTAL ABDOM HYSTERECTOMY      Description: Total Abdominal Hysterectomy;  Recorded: 2012;     C TOTAL ABDOM HYSTERECTOMY      Description: Hysterectomy;  Proc Date: 1996;      SECTION      x 2     ELBOW SURGERY       HYSTERECTOMY       HYSTERECTOMY       OOPHORECTOMY Bilateral      MN VAGINAL HYSTERECTOMY,UTERUS 250 GMS/<      Description: Vaginal Hysterectomy;  Recorded: 2013;     Medications  Current Outpatient Medications   Medication Sig Dispense Refill     acetaminophen (TYLENOL) 325 MG tablet Take 650 mg by mouth       aspirin (ASA) 81 MG EC tablet Take 81 mg by mouth       calcium carbonate 600 mg-vitamin D 400 units (CALTRATE) 600-400 MG-UNIT per tablet Take 1 tablet by mouth 2 times daily       ibuprofen (ADVIL/MOTRIN) 200 MG tablet Take 200 mg by mouth       methimazole (TAPAZOLE) 5 MG tablet Take 0.5 tablets (2.5 mg) by mouth daily 30 tablet 1     Multiple Vitamins-Minerals (MULTIVITAMIN " "ADULT PO)        PARoxetine (PAXIL) 20 MG tablet Take 20 mg by mouth       simvastatin (ZOCOR) 20 MG tablet Take 20 mg by mouth       paxil has been x 4 years   150 mcg biotin in her MVI    Allergies  Allergies   Allergen Reactions     Bacitracin Itching and Other (See Comments)     hoarseness       Sulfa Drugs      Family History  family history includes Breast Cancer (age of onset: 72.00) in her maternal aunt; Celiac Disease in her daughter and daughter; Cerebrovascular Disease in her father; Colon Cancer in her maternal grandmother; Dementia in her mother; Diabetes in her father; Goiter in her daughter; Heart Disease in her father; Hyperlipidemia in her mother; Hypertension in her sister; Rheumatoid Arthritis in her father; Thyroid nodules in her sister.    Social History  Social History     Tobacco Use     Smoking status: Never Smoker     Smokeless tobacco: Never Used   Substance Use Topics     Alcohol use: Yes     Alcohol/week: 3.0 standard drinks     Drug use: No     [place]; Not currently exercising - used to walk 4 miles every other day and stopped when it was too hot ; now she hasn't restarted.    Her PCP  is Dr Henderson    Physical Exam  There were no vitals taken for this visit.  There is no height or weight on file to calculate BMI.   BP Readings from Last 1 Encounters:   10/14/20 137/72      Pulse Readings from Last 1 Encounters:   10/14/20 86      Resp Readings from Last 1 Encounters:   No data found for Resp      Temp Readings from Last 1 Encounters:   No data found for Temp      SpO2 Readings from Last 1 Encounters:   10/14/20 95%      Wt Readings from Last 1 Encounters:   10/14/20 66.9 kg (147 lb 6.4 oz)      Ht Readings from Last 1 Encounters:   10/14/20 1.651 m (5' 5\")     GENERAL: very pleasant woman in  no distress  SKIN: Visible skin clear. No significant rash, abnormal pigmentation or lesions.  EYES: glasses; Eyes grossly normal to inspection.  No discharge or erythema, or obvious " scleral/conjunctival abnormalities. NO suggestion of LORETTA    NecK; no grossly visible goiter  RESP: No audible wheeze, cough, or visible cyanosis.  No visible retractions or increased work of breathing.    NEURO: Awake, alert, responds appropriately to questions.  Mentation and speech fluent.   PSYCH:affect normal, and appearance well-groomed.    ENDO THYROID LABS-[facility] Latest Ref Rng & Units 9/22/2021 4/28/2021   TSH 0.30 - 5.00 uIU/mL 3.84 3.59   T3 60 - 181 ng/dL 90 87   FREE T4 0.70 - 1.80 ng/dL 0.78    THYROGLOBULIN ANTIBODY 0.0 - 4.0 IU/mL       [facility] 3/29/2021 3/17/2021   TSH 0.30 - 5.00 uIU/mL 4.67 8.21 (H)   T3 60 - 181 ng/dL 76 68   FREE T4 0.70 - 1.80 ng/dL     THYROGLOBULIN ANTIBODY 0.0 - 4.0 IU/mL       [facility] Units 2/10/2021 1/13/2021   TSH 0.30 - 5.00 uIU/mL 4.95 2.86   T3 60 - 181 ng/dL 72 71   FREE T4 0.70 - 1.80 ng/dL     THYROGLOBULIN ANTIBODY 0.0 - 4.0 IU/mL       [facility] Latest [facility] Units 12/16/2020 10/14/2020   TSH 0.30 - 5.00 uIU/mL 0.07 (L) <0.01 (L)   T3 60 - 181 ng/dL 63 126   FREE T4 0.70 - 1.80 ng/dL     THYROGLOBULIN ANTIBODY 0.0 - 4.0 IU/mL  42.7 (H)     4/15/2021  RE: Ivon Newell  YOB: 1950  Dear Errol Henderson  Patient Profile:  71 y.o. female, postmenopausal, is here for the follow up bone density test.   History of fractures - None. Family history of osteoporosis - None.  Family history of hip fracture: None. Smoking history - No. Osteoporosis treatment past -  Yes;  HRT. Osteoporosis treatment current - No.  Chronic medical problems - None. High risk   medications -  None.  Assessment:  1. The spine bone density L1-L4 with T-score -0.9, with no statistically significant change compared to the previous DXA scan done in 2018.  2. Femoral bone densities show left femoral neck T- score -1.4 and right femoral neck T-score -1.4 and significant improvement of 3.5% on the right hip compared  to 2018.  3. Trabecular bone score indicates moderate trabecular bone architecture.  71 y.o. female with LOW BONE DENSITY (OSTEOPENIA) and MODERATE fracture risk, adjusted for the TBS, with major osteoporotic fracture risk 10.2% and hip fracture risk 1.4%.   Recommendations:  Appropriate calcium, vitamin D supplements, along with balance and weight bearing exercise recommended with follow up bone density scan in 2 years.  Bone densitometry was performed on your patient using our [employer] iDXA densitometer. The results are summarized and a copy of the actual scans are included for your review. In conformity with the International Society of Clinical Densitometry's most   recent position statement for DXA interpretation (2015), the diagnosis will be made on the lowest measured T-score of the lumbar spine, femoral neck, total proximal femur or 33% radius. Note the change in terminology for diagnostic classification from   OSTEOPENIA to LOW BONE MASS. All trending for sequential exams will be done using multiple vertebrae or the total proximal femur. Fracture risk is based on the WHO Fracture Risk Assessment Tool (FRAX). If additional information is needed or if you would   like to discuss the results, please do not hesitate to call me.    Thank you for referring this patient to [facility]. We are happy to be of service in support of you and your practice. If you have any questions or suggestions to improve our service, please call me at [phone].   Sincerely,   Errol Henderson M.D. JUANCJEM.  [facility]    Ivon is a 71 year old who is being evaluated via a billable video visit.      How would you like to obtain your AVS? MyChart  If the video visit is dropped, the invitation should be resent by: Text to cell phone: [phone]  Will anyone else be joining your video visit? No     Yumiko Scott, EMT

## 2021-09-27 NOTE — PROGRESS NOTES
Endocrine  Video visit note-     Attending Assessment/Plan :     History of Graves' Hyperthyroidism , subclinical, She took MMI 11/2020 to 3/2021.   Multiple labs since then have been normal. We haven't repeated the TSI.  She may be at risk for recurrence anytime in her life.   Suggest yearly TSH reflex or TFTS sooner as indicated by symptoms.   Graduate back to her primary care provider, Dr Henderson - return to endocrine if needed     Left nodule 0.4 x 0.2 x 0.3 . Recommend repeat US could be performed 11/2022 .  IF stable, then next 5 years later.       Sjogren syndrome in Ivon.  Family history of autoimune diseases. Suggested artificial tears eye drops    Low bone density - Bone gain on recent DXA compared with 2018.   Next DXA could be 4/15/2023 or thereafter     Post menopausal -    Due to the COVID 19 pandemic this visit was a video visit. The patient gave verbal consent for the visit today.    I have independently reviewed and interpreted labs, imaging as indicated.     Chart review/prep time 1  [id number]  Visit Start time 1359  Visit Stop time 1407  _30_ minutes spent on the date of the encounter doing chart review, history and exam, documentation and further activities as noted above.      Christiane Monteor MD    Chief complaint/ HISTORY OF PRESENT ILLNESS    Ivon returns for follow up of subclinical hyperthyroidism.I started her on methimazole 5 mg/day on 11/11/2020.   Labs  have shown progressive improvement. I last saw her 2/15/2021.  At that time we reduced MMI from 5 mg/day to 2.5 mg/day.  On 3/17/2021 she had TSH 8.21, free T4 0.7, T3 68.  I told her to stop the MMI. She has now been off MMI over 6 months.  TFTS on 9/22/2021 remain normal without treatment.     We have the following labs:   9/25/09 TSH 2.24  1/9/15 TSH 2.43  1/23/15 RF 35.8 (H), DOMINIQUE 6.7 (H)< SSA /Ro 183 (H)< SSB/La 37 (H)  1/15/16 TSH 2.3  10/14/2020 TSH < 0.01, free T4 1.1, T3 126, CAROLINA 42.7, .7, TRAB 1.53 25OHD 34.5, Ca  10  11/11/2020 TSI 2.93, TSH < 0.01, free T4 1.1, T3 126  12/16/2020 TSH 0.097, free T4 0.8, T3 63 on MMI 5 mg/day  1/13/2021 TSH 2.86, free T4 0.7, T3 71 on MMI 5 mg/day  2/10/2021 TSH 4.95, free T4 0.7, T3 72 on MMI 5 mg/day  3/17/2021 TSH 8.21, free T4 0.7, T3 68- on MMI 2.5 mg/day.  After this I told her to stop the MMI  3/29/2021 TSh 4.67, free T4 0.8, T3 76  4/28/2021 TSH 3.59, T3 87  9/22/2021 TSH 3.84, free T4 0.78, T3 90    Weights  10/14/2020 147 #   4/22/2020 158  5/20/19 156    10/20/2020 [facility] 28.3-- I have reviewed the imges on pACS - they show diffuse uptake  Left > right .     11/2/2020 thyroid US:   Thyroid normal size, slightly heterogeneous, grade 3 blood flow on some images, grade 1 on image with CCA (presumably gain is turned way down)  Right inferior nodule 0.3 x 0.2 x 0.3 cm hypoechoic  Left nodule 0.4 x 0.2 x 0.3     4/15/2021 DXA, as read by me: lowest T-score -1.4  Left femoral neck , right femoral neck   L spine 1.071 was 1.051  Left total hip 0.845 was 0.826  Right total hp 0.844 was 9.796      REVIEW OF SYSTEMS  Feeling normal     Answers for HPI/ROS submitted by the patient on 9/24/2021  General Symptoms: No  Skin Symptoms: No  HENT Symptoms: No  EYE SYMPTOMS: No-- saw eye doctor about 6 weeks ago; eyes are a little dry; She does not use eye drops.   -HEART SYMPTOMS: Yes-- very erratic and infrequent - irregular bouts of 10 seconds pounding out of chest. This last occurred last week - occurring when reading a book It doesn't happen in bed at night;   LUNG SYMPTOMS: No; exercise tolerance is good.   INTESTINAL SYMPTOMS: No  URINARY SYMPTOMS: No  GYNECOLOGIC SYMPTOMS: No  BREAST SYMPTOMS: No  SKELETAL SYMPTOMS: No  BLOOD SYMPTOMS: No  NERVOUS SYSTEM SYMPTOMS: No  MENTAL HEALTH SYMPTOMS: No  Chest pain or pressure: No  Fast or irregular heartbeat: No  Pain in legs with walking: No  Trouble breathing while lying down: No  Fingers or toes appear  "blue: No  High blood pressure: No  Low blood pressure: No  Fainting: No  Murmurs: No  Pacemaker: No  Varicose veins: No  Edema or swelling: No  Wake up at night with shortness of breath: No  Light-headedness: No  Exercise intolerance: No    Had \":cellulitis:\" (self diagnosis)  in foot x 10 days in mid July - onset started stepping on a stinger wearing Birkenstocks.  She did not take antibiotics.  She soaked it       Past Medical History  Past Medical History:   Diagnosis Date     Anxiety      Chronic cough      Hyperlipidemia      Low bone density      Sjogren's syndrome (H)      Past Surgical History:   Procedure Laterality Date     BREAST CYST ASPIRATION      While ago     C TOTAL ABDOM HYSTERECTOMY      Description: Total Abdominal Hysterectomy;  Recorded: 2012;     C TOTAL ABDOM HYSTERECTOMY      Description: Hysterectomy;  Proc Date: 1996;      SECTION      x 2     ELBOW SURGERY       HYSTERECTOMY       HYSTERECTOMY       OOPHORECTOMY Bilateral      [place] VAGINAL HYSTERECTOMY,UTERUS 250 GMS/<      Description: Vaginal Hysterectomy;  Recorded: 2013;     Medications  Current Outpatient Medications   Medication Sig Dispense Refill     acetaminophen (TYLENOL) 325 MG tablet Take 650 mg by mouth       aspirin (ASA) 81 MG EC tablet Take 81 mg by mouth       calcium carbonate 600 mg-vitamin D 400 units (CALTRATE) 600-400 MG-UNIT per tablet Take 1 tablet by mouth 2 times daily       ibuprofen (ADVIL/MOTRIN) 200 MG tablet Take 200 mg by mouth       methimazole (TAPAZOLE) 5 MG tablet Take 0.5 tablets (2.5 mg) by mouth daily 30 tablet 1     Multiple Vitamins-Minerals (MULTIVITAMIN ADULT PO)        PARoxetine (PAXIL) 20 MG tablet Take 20 mg by mouth       simvastatin (ZOCOR) 20 MG tablet Take 20 mg by mouth       paxil has been x 4 years   150 mcg biotin in her MVI    Allergies  Allergies   Allergen Reactions     Bacitracin Itching and Other (See Comments)     hoarseness       Sulfa Drugs  " "    Family History  family history includes Breast Cancer (age of onset: 72.00) in her maternal aunt; Celiac Disease in her daughter and daughter; Cerebrovascular Disease in her father; Colon Cancer in her maternal grandmother; Dementia in her mother; Diabetes in her father; Goiter in her daughter; Heart Disease in her father; Hyperlipidemia in her mother; Hypertension in her sister; Rheumatoid Arthritis in her father; Thyroid nodules in her sister.    Social History  Social History     Tobacco Use     Smoking status: Never Smoker     Smokeless tobacco: Never Used   Substance Use Topics     Alcohol use: Yes     Alcohol/week: 3.0 standard drinks     Drug use: No     [place]; Not currently exercising - used to walk 4 miles every other day and stopped when it was too hot ; now she hasn't restarted.    Her PCP  is Dr Henderson    Physical Exam  There were no vitals taken for this visit.  There is no height or weight on file to calculate BMI.   BP Readings from Last 1 Encounters:   10/14/20 137/72      Pulse Readings from Last 1 Encounters:   10/14/20 86      Resp Readings from Last 1 Encounters:   No data found for Resp      Temp Readings from Last 1 Encounters:   No data found for Temp      SpO2 Readings from Last 1 Encounters:   10/14/20 95%      Wt Readings from Last 1 Encounters:   10/14/20 66.9 kg (147 lb 6.4 oz)      Ht Readings from Last 1 Encounters:   10/14/20 1.651 m (5' 5\")     GENERAL: very pleasant woman in  no distress  SKIN: Visible skin clear. No significant rash, abnormal pigmentation or lesions.  EYES: glasses; Eyes grossly normal to inspection.  No discharge or erythema, or obvious scleral/conjunctival abnormalities. NO suggestion of LORETTA    NecK; no grossly visible goiter  RESP: No audible wheeze, cough, or visible cyanosis.  No visible retractions or increased work of breathing.    NEURO: Awake, alert, responds appropriately to questions.  Mentation and speech fluent.   PSYCH:affect normal, " and appearance well-groomed.    ENDO THYROID LABS-[facility] Latest Ref Rng & Units 9/22/2021 4/28/2021   TSH 0.30 - 5.00 uIU/mL 3.84 3.59   T3 60 - 181 ng/dL 90 87   FREE T4 0.70 - 1.80 ng/dL 0.78    THYROGLOBULIN ANTIBODY 0.0 - 4.0 IU/mL       ENDO THYROID LABS-[facility] Latest Ref Rng & Units 3/29/2021 3/17/2021   TSH 0.30 - 5.00 uIU/mL 4.67 8.21 (H)   T3 60 - 181 ng/dL 76 68   FREE T4 0.70 - 1.80 ng/dL     THYROGLOBULIN ANTIBODY 0.0 - 4.0 IU/mL       ENDO THYROID LABS-[facility] Latest Ref Rng & Units 2/10/2021 1/13/2021   TSH 0.30 - 5.00 uIU/mL 4.95 2.86   T3 60 - 181 ng/dL 72 71   FREE T4 0.70 - 1.80 ng/dL     THYROGLOBULIN ANTIBODY 0.0 - 4.0 IU/mL       ENDO THYROID LABS-[facility] Latest Ref Rng & Units 12/16/2020 10/14/2020   TSH 0.30 - 5.00 uIU/mL 0.07 (L) <0.01 (L)   T3 60 - 181 ng/dL 63 126   FREE T4 0.70 - 1.80 ng/dL     THYROGLOBULIN ANTIBODY 0.0 - 4.0 IU/mL  42.7 (H)     4/15/2021  RE: Ivon Newell  YOB: 1950  Dear Errol Henderson  Patient Profile:  71 y.o. female, postmenopausal, is here for the follow up bone density test.   History of fractures - None. Family history of osteoporosis - None.  Family history of hip fracture: None. Smoking history - No. Osteoporosis treatment past -  Yes;  HRT. Osteoporosis treatment current - No.  Chronic medical problems - None. High risk   medications -  None.  Assessment:  1. The spine bone density L1-L4 with T-score -0.9, with no statistically significant change compared to the previous DXA scan done in 2018.  2. Femoral bone densities show left femoral neck T- score -1.4 and right femoral neck T-score -1.4 and significant improvement of 3.5% on the right hip compared to 2018.  3. Trabecular bone score indicates moderate trabecular bone architecture.  71 y.o. female with LOW BONE DENSITY (OSTEOPENIA) and MODERATE fracture risk, adjusted for the TBS, with major osteoporotic fracture risk 10.2% and hip fracture risk 1.4%.   Recommendations:  Appropriate calcium, vitamin D  supplements, along with balance and weight bearing exercise recommended with follow up bone density scan in 2 years.  Bone densitometry was performed on your patient using our [employer] iDXA densitometer. The results are summarized and a copy of the actual scans are included for your review. In conformity with the International Society of Clinical Densitometry's most   recent position statement for DXA interpretation (2015), the diagnosis will be made on the lowest measured T-score of the lumbar spine, femoral neck, total proximal femur or 33% radius. Note the change in terminology for diagnostic classification from   OSTEOPENIA to LOW BONE MASS. All trending for sequential exams will be done using multiple vertebrae or the total proximal femur. Fracture risk is based on the WHO Fracture Risk Assessment Tool (FRAX). If additional information is needed or if you would   like to discuss the results, please do not hesitate to call me.    Thank you for referring this patient to [facility]. We are happy to be of service in support of you and your practice. If you have any questions or suggestions to improve our service, please call me at [phone].   Sincerely,   JOSE Henry.  [facility]

## 2021-09-27 NOTE — LETTER
Ivon eNwell  [address] [place] [id number]    Dear Errol Henderson MD    Thank you for referring your patient, Ivon Newell for evaluation and treatment of subclinical hyperthyroidism due to Graves'.  She was transiently treated with methimazole but has now bee off medication since March, maintaining normal thyroid blood levels.   Ivon has stabilized and no longer needs specialty care and would be best served in your primary care office.  In short, we are handing the care for this condition back to you. This includes all ongoing prescriptions, lab monitoring and follow up appointments, which are detailed below.  Ivon is aware of our recommendation.         We recommend the following ongoing testing and examination:    Yearly TSH reflex, or thyroid testing sooner prn new symptom concerns  Thyroid Ultrasound around November, 2022- to follow up on the 4 mm thyroid nodule noted on the last ultrasound    Feel free to contact me with any questions or concerns that may arise with the transition of  Ivon s care. The easiest way to do that is send an Epic message.  Alternatively, we would be happy to see Ivon, as needed in the future.  Please place a referral if you feel she needs to be seen in our clinic again.  Thank you for your referral.       Sincerely,      Christiane Montero MD

## 2021-09-27 NOTE — PATIENT INSTRUCTIONS
You may return to the care of your primary provider and see us in the Endocrine clinic if needed in the future      I suggest the following:   Check the thyroid once/year or sooner if concern  Repeat the thyroid ultrasound around 11/2022 to confirm stability compared with the last one  Your next bone density should be in 2 years after the last one, on the same machine

## 2021-09-27 NOTE — PROGRESS NOTES
Ivon is a 71 year old who is being evaluated via a billable video visit.      How would you like to obtain your AVS? MyChart  If the video visit is dropped, the invitation should be resent by: Text to cell phone: [phone]  Will anyone else be joining your video visit? Kristi Scott, EMT

## 2021-09-28 PROBLEM — E05.90 SUBCLINICAL HYPERTHYROIDISM: Status: RESOLVED | Noted: 2020-11-04 | Resolved: 2021-09-28

## 2021-09-28 PROBLEM — Z86.39 HISTORY OF GRAVES' DISEASE: Status: ACTIVE | Noted: 2021-09-28

## 2021-09-28 PROBLEM — M85.9 LOW BONE DENSITY: Status: ACTIVE | Noted: 2021-09-28

## 2021-10-11 ENCOUNTER — HEALTH MAINTENANCE LETTER (OUTPATIENT)
Age: 71
End: 2021-10-11

## 2021-10-17 ENCOUNTER — TRANSFERRED RECORDS (OUTPATIENT)
Dept: HEALTH INFORMATION MANAGEMENT | Facility: CLINIC | Age: 71
End: 2021-10-17

## 2021-12-05 ENCOUNTER — HEALTH MAINTENANCE LETTER (OUTPATIENT)
Age: 71
End: 2021-12-05

## 2021-12-09 DIAGNOSIS — E78.5 HYPERLIPIDEMIA, UNSPECIFIED: ICD-10-CM

## 2021-12-09 DIAGNOSIS — F41.9 ANXIETY DISORDER, UNSPECIFIED: ICD-10-CM

## 2021-12-10 RX ORDER — PAROXETINE 20 MG/1
TABLET, FILM COATED ORAL
Qty: 90 TABLET | Refills: 2 | Status: SHIPPED | OUTPATIENT
Start: 2021-12-10 | End: 2022-04-19

## 2021-12-10 RX ORDER — SIMVASTATIN 20 MG
TABLET ORAL
Qty: 90 TABLET | Refills: 2 | Status: SHIPPED | OUTPATIENT
Start: 2021-12-10 | End: 2022-04-19

## 2022-04-05 ASSESSMENT — ACTIVITIES OF DAILY LIVING (ADL): CURRENT_FUNCTION: NO ASSISTANCE NEEDED

## 2022-04-07 ENCOUNTER — OFFICE VISIT (OUTPATIENT)
Dept: INTERNAL MEDICINE | Facility: CLINIC | Age: 72
End: 2022-04-07
Payer: COMMERCIAL

## 2022-04-07 VITALS
OXYGEN SATURATION: 98 % | HEIGHT: 65 IN | BODY MASS INDEX: 26.48 KG/M2 | WEIGHT: 158.9 LBS | SYSTOLIC BLOOD PRESSURE: 137 MMHG | DIASTOLIC BLOOD PRESSURE: 82 MMHG | HEART RATE: 82 BPM

## 2022-04-07 DIAGNOSIS — Z86.39 HISTORY OF GRAVES' DISEASE: ICD-10-CM

## 2022-04-07 DIAGNOSIS — Z13.220 SCREENING FOR HYPERLIPIDEMIA: ICD-10-CM

## 2022-04-07 DIAGNOSIS — Z23 HIGH PRIORITY FOR 2019-NCOV VACCINE: ICD-10-CM

## 2022-04-07 DIAGNOSIS — Z12.31 VISIT FOR SCREENING MAMMOGRAM: ICD-10-CM

## 2022-04-07 DIAGNOSIS — R00.2 PALPITATIONS: ICD-10-CM

## 2022-04-07 DIAGNOSIS — Z00.00 ENCOUNTER FOR MEDICARE ANNUAL WELLNESS EXAM: Primary | ICD-10-CM

## 2022-04-07 DIAGNOSIS — E04.1 THYROID NODULE: ICD-10-CM

## 2022-04-07 DIAGNOSIS — M85.9 LOW BONE DENSITY: ICD-10-CM

## 2022-04-07 LAB
ERYTHROCYTE [DISTWIDTH] IN BLOOD BY AUTOMATED COUNT: 13.1 % (ref 10–15)
HCT VFR BLD AUTO: 37.6 % (ref 35–47)
HGB BLD-MCNC: 12.7 G/DL (ref 11.7–15.7)
MCH RBC QN AUTO: 31.4 PG (ref 26.5–33)
MCHC RBC AUTO-ENTMCNC: 33.8 G/DL (ref 31.5–36.5)
MCV RBC AUTO: 93 FL (ref 78–100)
PLATELET # BLD AUTO: 191 10E3/UL (ref 150–450)
RBC # BLD AUTO: 4.05 10E6/UL (ref 3.8–5.2)
WBC # BLD AUTO: 5.6 10E3/UL (ref 4–11)

## 2022-04-07 PROCEDURE — 84443 ASSAY THYROID STIM HORMONE: CPT | Performed by: INTERNAL MEDICINE

## 2022-04-07 PROCEDURE — 0054A COVID-19,PF,PFIZER (12+ YRS): CPT | Performed by: INTERNAL MEDICINE

## 2022-04-07 PROCEDURE — 84439 ASSAY OF FREE THYROXINE: CPT | Performed by: INTERNAL MEDICINE

## 2022-04-07 PROCEDURE — 82306 VITAMIN D 25 HYDROXY: CPT | Performed by: INTERNAL MEDICINE

## 2022-04-07 PROCEDURE — 80061 LIPID PANEL: CPT | Performed by: INTERNAL MEDICINE

## 2022-04-07 PROCEDURE — 91305 COVID-19,PF,PFIZER (12+ YRS): CPT | Performed by: INTERNAL MEDICINE

## 2022-04-07 PROCEDURE — 36415 COLL VENOUS BLD VENIPUNCTURE: CPT | Performed by: INTERNAL MEDICINE

## 2022-04-07 PROCEDURE — 99213 OFFICE O/P EST LOW 20 MIN: CPT | Mod: 25 | Performed by: INTERNAL MEDICINE

## 2022-04-07 PROCEDURE — 85027 COMPLETE CBC AUTOMATED: CPT | Performed by: INTERNAL MEDICINE

## 2022-04-07 PROCEDURE — 80053 COMPREHEN METABOLIC PANEL: CPT | Performed by: INTERNAL MEDICINE

## 2022-04-07 PROCEDURE — 84480 ASSAY TRIIODOTHYRONINE (T3): CPT | Performed by: INTERNAL MEDICINE

## 2022-04-07 PROCEDURE — 99397 PER PM REEVAL EST PAT 65+ YR: CPT | Mod: 25 | Performed by: INTERNAL MEDICINE

## 2022-04-07 ASSESSMENT — ACTIVITIES OF DAILY LIVING (ADL): CURRENT_FUNCTION: NO ASSISTANCE NEEDED

## 2022-04-07 NOTE — PATIENT INSTRUCTIONS
To schedule mammogram.  US thyroid November 2022. [phone]  DXA scan next year.  Colonoscopy in 2024.    Covid booster and labs today.    If labs are ok, please schedule cardiac monitor.        Patient Education   Personalized Prevention Plan  You are due for the preventive services outlined below.  Your care team is available to assist you in scheduling these services.  If you have already completed any of these items, please share that information with your care team to update in your medical record.  Health Maintenance Due   Topic Date Due     ANNUAL REVIEW OF HM ORDERS  Never done     FALL RISK ASSESSMENT  10/14/2021     Mammogram  04/12/2022       Exercise for a Healthier Heart  You may wonder how you can improve the health of your heart. If you re thinking about exercise, you re on the right track. You don t need to become an athlete. But you do need a certain amount of brisk exercise to help strengthen your heart. If you have been diagnosed with a heart condition, your healthcare provider may advise exercise to help stabilize your condition. To help make exercise a habit, choose safe, fun activities.      Exercise with a friend. When activity is fun, you're more likely to stick with it.   Before you start  Check with your healthcare provider before starting an exercise program. This is especially important if you have not been active for a while. It's also important if you have a long-term (chronic) health problem such as heart disease, diabetes, or obesity. Or if you are at high risk for having these problems.   Why exercise?  Exercising regularly offers many healthy rewards. It can help you do all of the following:     Improve your blood cholesterol level to help prevent further heart trouble    Lower your blood pressure to help prevent a stroke or heart attack    Control diabetes, or reduce your risk of getting this disease    Improve your heart and lung function    Reach and stay at a healthy  weight    Make your muscles stronger so you can stay active    Prevent falls and fractures by slowing the loss of bone mass (osteoporosis)    Manage stress better    Reduce your blood pressure    Improve your sense of self and your body image  Exercise tips      Ease into your routine. Set small goals. Then build on them. If you are not sure what your activity level should be, talk with your healthcare provider first before starting an exercise routine.    Exercise on most days. Aim for a total of 150 minutes (2 hours and 30 minutes) or more of moderate-intensity aerobic activity each week. Or 75 minutes (1 hour and 15 minutes) or more of vigorous-intensity aerobic activity each week. Or try for a combination of both. Moderate activity means that you breathe heavier and your heart rate increases but you can still talk. Think about doing 40 minutes of moderate exercise, 3 to 4 times a week. For best results, activity should last for about 40 minutes to lower blood pressure and cholesterol. It's OK to work up to the 40-minute period over time. Examples of moderate-intensity activity are walking 1 mile in 15 minutes. Or doing 30 to 45 minutes of yard work.    Step up your daily activity level.  Along with your exercise program, try being more active the whole day. Walk instead of drive. Or park further away so that you take more steps each day. Do more household tasks or yard work. You may not be able to meet the advised mount of physical activity. But doing some moderate- or vigorous-intensity aerobic activity can help reduce your risk for heart disease. Your healthcare provider can help you figure out what is best for you.    Choose 1 or more activities you enjoy.  Walking is one of the easiest things you can do. You can also try swimming, riding a bike, dancing, or taking an exercise class.    When to call your healthcare provider  Call your healthcare provider if you have any of these:     Chest pain or feel dizzy  or lightheaded    Burning, tightness, pressure, or heaviness in your chest, neck, shoulders, back, or arms    Abnormal shortness of breath    More joint or muscle pain    A very fast or irregular heartbeat (palpitations)  [employer] last reviewed this educational content on 7/1/2019 2000-2021 [employer]. All rights reserved. This information is not intended as a substitute for professional medical care. Always follow your healthcare professional's instructions.          Understanding USDA MyPlate  The USDA has guidelines to help you make healthy food choices. These are called MyPlate. MyPlate shows the food groups that make up healthy meals using the image of a place setting. Before you eat, think about the healthiest choices for what to put on your plate or in your cup or bowl. To learn more about building a healthy plate, visit www.choosemyplate.gov.    The food groups    Fruits. Any fruit or 100% fruit juice counts as part of the Fruit Group. Fruits may be fresh, canned, frozen, or dried, and may be whole, cut-up, or pureed. Make 1/2 of your plate fruits and vegetables.    Vegetables. Any vegetable or 100% vegetable juice counts as a member of the Vegetable Group. Vegetables may be fresh, frozen, canned, or dried. They can be served raw or cooked and may be whole, cut-up, or mashed. Make 1/2 of your plate fruits and vegetables.    Grains. All foods made from grains are part of the Grains Group. These include wheat, rice, oats, cornmeal, and barley. Grains are often used to make foods such as bread, pasta, oatmeal, cereal, tortillas, and grits. Grains should be no more than 1/4 of your plate. At least half of your grains should be whole grains.    Protein. This group includes meat, poultry, seafood, beans and peas, eggs, processed soy products (such as tofu), nuts (including nut butters), and seeds. Make protein choices no more than 1/4 of your plate. Meat and poultry choices should be lean or low  fat.    Dairy. The Dairy Group includes all fluid milk products and foods made from milk that contain calcium, such as yogurt and cheese. (Foods that have little calcium, such as cream, butter, and cream cheese, are not part of this group.) Most dairy choices should be low-fat or fat-free.    Oils. Oils aren't a food group, but they do contain essential nutrients. However it's important to watch your intake of oils. These are fats that are liquid at room temperature. They include canola, corn, olive, soybean, vegetable, and sunflower oil. Foods that are mainly oil include mayonnaise, certain salad dressings, and soft margarines. You likely already get your daily oil allowance from the foods you eat.  Things to limit  Eating healthy also means limiting these things in your diet:       Salt (sodium). Many processed foods have a lot of sodium. To keep sodium intake down, eat fresh vegetables, meats, poultry, and seafood when possible. Purchase low-sodium, reduced-sodium, or no-salt-added food products at the store. And don't add salt to your meals at home. Instead, season them with herbs and spices such as dill, oregano, cumin, and paprika. Or try adding flavor with lemon or lime zest and juice.    Saturated fat. Saturated fats are most often found in animal products such as beef, pork, and chicken. They are often solid at room temperature, such as butter. To reduce your saturated fat intake, choose leaner cuts of meat and poultry. And try healthier cooking methods such as grilling, broiling, roasting, or baking. For a simple lower-fat swap, use plain nonfat yogurt instead of mayonnaise when making potato salad or macaroni salad.    Added sugars. These are sugars added to foods. They are in foods such as ice cream, candy, soda, fruit drinks, sports drinks, energy drinks, cookies, pastries, jams, and syrups. Cut down on added sugars by sharing sweet treats with a family member or friend. You can also choose fruit for  dessert, and drink water or other unsweetened beverages.     [email or web address] last reviewed this educational content on 6/1/2020 2000-2021 [employer]. All rights reserved. This information is not intended as a substitute for professional medical care. Always follow your healthcare professional's instructions.          Urinary Incontinence, Female (Adult)   Urinary incontinence means loss of bladder control. This problem affects many women, especially as they get older. If you have incontinence, you may be embarrassed to ask for help. But know that this problem can be treated.   Types of Incontinence  There are different types of incontinence. Two of the main types are described here. You can have more than one type.     Stress incontinence. With this type, urine leaks when pressure (stress) is put on the bladder. This may happen when you cough, sneeze, or laugh. Stress incontinence most often occurs because the pelvic floor muscles that support the bladder and urethra are weak. This can happen after pregnancy and vaginal childbirth or a hysterectomy. It can also be due to excess body weight or hormone changes.    Urge incontinence (also called overactive bladder). With this type, a sudden urge to urinate is felt often. This may happen even though there may not be much urine in the bladder. The need to urinate often during the night is common. Urge incontinence most often occurs because of bladder spasms. This may be due to bladder irritation or infection. Damage to bladder nerves or pelvic muscles, constipation, and certain medicines can also lead to urge incontinence.  Treatment depends on the cause. Further evaluation is needed to find the type you have. This will likely include an exam and certain tests. Based on the results, you and your healthcare provider can then plan treatment. Until a diagnosis is made, the home care tips below can help ease symptoms.   Home care    Do pelvic floor muscle  exercises, if they are prescribed. The pelvic floor muscles help support the bladder and urethra. Many women find that their symptoms improve when doing special exercises that strengthen these muscles. To do the exercises, contract the muscles you would use to stop your stream of urine. But do this when you re not urinating. Hold for 10 seconds, then relax. Repeat 10 to 20 times in a row, at least 3 times a day. Your healthcare provider may give you other instructions for how to do the exercises and how often.    Keep a bladder diary. This helps track how often and how much you urinate over a set period of time. Bring this diary with you to your next visit with the provider. The information can help your provider learn more about your bladder problem.    Lose weight, if advised to by your provider. Extra weight puts pressure on the bladder. Your provider can help you create a weight-loss plan that s right for you. This may include exercising more and making certain diet changes.    Don't have foods and drinks that may irritate the bladder. These can include alcohol and caffeinated drinks.    Quit smoking. Smoking and other tobacco use can lead to a long-term (chronic) cough that strains the pelvic floor muscles. Smoking may also damage the bladder and urethra. Talk with your provider about treatments or methods you can use to quit smoking.    If drinking large amounts of fluid makes you have symptoms, you may be advised to limit your fluid intake. You may also be advised to drink most of your fluids during the day and to limit fluids at night.    If you re worried about urine leakage or accidents, you may wear absorbent pads to catch urine. Change the pads often. This helps reduce discomfort. It may also reduce the risk of skin or bladder infections.    Follow-up care  Follow up with your healthcare provider, or as directed. It may take some to find the right treatment for your problem. But healthy lifestyle changes  can be made right away. These include such things as exercising on a regular basis, eating a healthy diet, losing weight (if needed), and quitting smoking. Your treatment plan may include special therapies or medicines. Certain procedures or surgery may also be options. Talk about any questions you have with your provider.   When to seek medical advice  Call the healthcare provider right away if any of these occur:    Fever of 100.4 F (38 C) or higher, or as directed by your provider    Bladder pain or fullness    Belly swelling    Nausea or vomiting    Back pain    Weakness, dizziness, or fainting  [employer] last reviewed this educational content on 1/1/2020 2000-2021 [employer]. All rights reserved. This information is not intended as a substitute for professional medical care. Always follow your healthcare professional's instructions.

## 2022-04-07 NOTE — PROGRESS NOTES
"    She is at risk for lack of exercise and has been provided with information to increase physical activity for the benefit of her well-being.  The patient was counseled and encouraged to consider modifying their diet and eating habits. She was provided with information on recommended healthy diet options.  Information on urinary incontinence and treatment options given to patient.  Answers for HPI/ROS submitted by the patient on 4/5/2022  In general, how would you rate your overall physical health?: excellent  Frequency of exercise:: None  Do you usually eat at least 4 servings of fruit and vegetables a day, include whole grains & fiber, and avoid regularly eating high fat or \"junk\" foods? : No  Taking medications regularly:: Yes  Medication side effects:: None  Activities of Daily Living: no assistance needed  Home safety: no safety concerns identified  Hearing Impairment:: no hearing concerns  In the past 6 months, have you been bothered by leaking of urine?: Yes  In general, how would you rate your overall mental or emotional health?: excellent  Additional concerns today:: No      "

## 2022-04-07 NOTE — PROGRESS NOTES
"SUBJECTIVE:   Ivon Newell is a 72 year old female who presents for Preventive Visit.      Patient has been advised of split billing requirements and indicates understanding: Yes  Are you in the first 12 months of your Medicare coverage?  No    Healthy Habits:     In general, how would you rate your overall health?  Excellent    Frequency of exercise:  None    Do you usually eat at least 4 servings of fruit and vegetables a day, include whole grains    & fiber and avoid regularly eating high fat or \"junk\" foods?  No    Taking medications regularly:  Yes    Medication side effects:  None    Ability to successfully perform activities of daily living:  No assistance needed    Home Safety:  No safety concerns identified    Hearing Impairment:  No hearing concerns    In the past 6 months, have you been bothered by leaking of urine? Yes    In general, how would you rate your overall mental or emotional health?  Excellent      PHQ-2 Total Score: 0    Additional concerns today:  No    Do you feel safe in your environment? Yes    Have you ever done Advance Care Planning? (For example, a Health Directive, POLST, or a discussion with a medical provider or your loved ones about your wishes): Yes, advance care planning is on file.       Fall risk  Fallen 2 or more times in the past year?: No  Any fall with injury in the past year?: No    Cognitive Screening   1) Repeat 3 items (Leader, Season, Table)    2) Clock draw: NORMAL  3) 3 item recall: Recalls 3 objects  Results: NORMAL clock, 1-2 items recalled: COGNITIVE IMPAIRMENT LESS LIKELY    Mini-CogTM Copyright [employer]. Licensed by the author for use in [facility]; reprinted with permission (trisha@.[email or web address]). All rights reserved.      Do you have sleep apnea, excessive snoring or daytime drowsiness?: no    Reviewed and updated as needed this visit by clinical staff   Tobacco  Allergies  Meds              Reviewed and updated as needed this visit by Provider           " "      Social History     Tobacco Use     Smoking status: Never Smoker     Smokeless tobacco: Never Used   Substance Use Topics     Alcohol use: Yes     Alcohol/week: 3.0 standard drinks         Alcohol Use 4/5/2022   Prescreen: >3 drinks/day or >7 drinks/week? No               Current providers sharing in care for this patient include:   Patient Care Team:  Errol Henderson MD as PCP - General (Internal Medicine)  Christiane Montero MD as MD (INTERNAL MEDICINE - ENDOCRINOLOGY, DIABETES & METABOLISM)  Christiane Montero MD as Assigned Endocrinology Provider  Errol Henderson MD as Assigned PCP    The following health maintenance items are reviewed in Epic and correct as of today:  Health Maintenance Due   Topic Date Due     ANNUAL REVIEW OF HM ORDERS  Never done     FALL RISK ASSESSMENT  10/14/2021     MAMMO SCREENING  04/12/2022               Review of Systems  CONSTITUTIONAL: NEGATIVE for fever, chills, change in weight  INTEGUMENTARY/SKIN: NEGATIVE for worrisome rashes, moles or lesions  EYES: NEGATIVE for vision changes or irritation  ENT/MOUTH: NEGATIVE for ear, mouth and throat problems  RESP: NEGATIVE for significant cough or SOB  BREAST: NEGATIVE for masses, tenderness or discharge  CV: NEGATIVE for chest pain, palpitations or peripheral edema  GI: NEGATIVE for nausea, abdominal pain, heartburn, or change in bowel habits  : NEGATIVE for frequency, dysuria, or hematuria  MUSCULOSKELETAL: NEGATIVE for significant arthralgias or myalgia  NEURO: NEGATIVE for weakness, dizziness or paresthesias  ENDOCRINE: NEGATIVE for temperature intolerance, skin/hair changes  HEME: NEGATIVE for bleeding problems  PSYCHIATRIC: NEGATIVE for changes in mood or affect    OBJECTIVE:   /82   Pulse 82   Ht 1.638 m (5' 4.5\")   Wt 72.1 kg (158 lb 14.4 oz)   SpO2 98%   BMI 26.85 kg/m   Estimated body mass index is 26.85 kg/m  as calculated from the following:    Height as of this encounter: 1.638 m (5' 4.5\").    Weight as " of this encounter: 72.1 kg (158 lb 14.4 oz).  Physical Exam  General Appearance: Alert, cooperative, no distress, appears stated age.  Head: Normocephalic, without obvious abnormality, atraumatic  Eyes: PERRL, conjunctiva/corneas clear, EOM's intact  Ears: Normal TM's and external ear canals, both ears  Nose: Nares normal, septum midline,mucosa normal, no drainage  Throat: Lips, mucosa, and tongue normal; teeth and gums normal  Neck: Supple, symmetrical, trachea midline, no adenopathy;  thyroid: not enlarged, symmetric, no tenderness/mass/nodules; no carotid bruit or JVD  Back: Symmetric, no curvature, ROM normal, no CVA tenderness.  Lungs: Clear to auscultation bilaterally, respirations unlabored.  Breasts: No breast masses, tenderness, asymmetry, or nipple discharge.  Heart: Regular rate and rhythm, S1 and S2 normal, no murmur, rub, or gallop.  Abdomen: Soft, non-tender, bowel sounds active all four quadrants,  no masses, no organomegaly.  Extremities: Extremities normal, atraumatic, no cyanosis or edema.  Skin: Skin color, texture, turgor normal, no rashes or lesions.  Lymph nodes: Cervical, supraclavicular, and axillary nodes normal.  Neurologic: No focal neurological findings.          ASSESSMENT / PLAN:   (Z00.00) Encounter for Medicare annual wellness exam  (primary encounter diagnosis)      (Z12.31) Visit for screening mammogram  Comment:   Plan: MA SCREENING DIGITAL BILAT - Future  (s+30)            (Z86.39) History of Graves' disease   Comment: Diagnosed in 2020, was on methimazole 11/2020-3/2021.Multiple labs since then have been normal. She may be at risk for recurrence anytime in her life.   Plan: CBC with platelets, Comprehensive metabolic         panel, TSH with free T4 reflex, T3, total, T4         free, TSH            (M85.80) Low bone density  Comment: last DXA in 2021.  Plan: Vitamin D Deficiency            (R00.2) Palpitations  Comment: Patient noticed almost daily palpitations in the last month.  "She denies chest pain, dyspnea, weight loss, diarrhea, sweating. Thyroid tests will be done to rule out clinical hyperthyroidism. If thyroid tests are normal, she will schedule cardiac monitor.  Plan: Cardiac Event Monitor Adult/Pediatric            (Z23) High priority for 2019-nCoV vaccine  Comment:   Plan: COVID-19,PF,PFIZER (12+ Yrs GRAY LABEL)            (E04.1) Thyroid nodule  Comment: Left nodule 0.4 x 0.2 x 0.3 . Recommend repeat US could be performed 11/2022 .  IF stable, then next 5 years later.   Plan: US Thyroid            (Z13.220) Screening for hyperlipidemia  Comment:   Plan: Lipid panel reflex to direct LDL Fasting              Patient has been advised of split billing requirements and indicates understanding: Yes    COUNSELING:  Reviewed preventive health counseling, as reflected in patient instructions       Regular exercise       Healthy diet/nutrition    Estimated body mass index is 26.85 kg/m  as calculated from the following:    Height as of this encounter: 1.638 m (5' 4.5\").    Weight as of this encounter: 72.1 kg (158 lb 14.4 oz).        She reports that she has never smoked. She has never used smokeless tobacco.      Appropriate preventive services were discussed with this patient, including applicable screening as appropriate for cardiovascular disease, diabetes, osteopenia/osteoporosis, and glaucoma.  As appropriate for age/gender, discussed screening for colorectal cancer, prostate cancer, breast cancer, and cervical cancer. Checklist reviewing preventive services available has been given to the patient.    Reviewed patients plan of care and provided an AVS. The Basic Care Plan (routine screening as documented in Health Maintenance) for Ivon meets the Care Plan requirement. This Care Plan has been established and reviewed with the Patient.    Counseling Resources:  ATP IV Guidelines  Pooled Cohorts Equation Calculator  Breast Cancer Risk Calculator  Breast Cancer: Medication to Reduce " Risk  FRAX Risk Assessment  ICSI Preventive Guidelines  Dietary Guidelines for Americans, 2010  USDA's MyPlate  ASA Prophylaxis  Lung CA Screening    Errol Henderson MD  [facility]    Identified Health Risks:

## 2022-04-08 LAB
ALBUMIN SERPL-MCNC: 4.2 G/DL (ref 3.5–5)
ALP SERPL-CCNC: 101 U/L (ref 45–120)
ALT SERPL W P-5'-P-CCNC: 11 U/L (ref 0–45)
ANION GAP SERPL CALCULATED.3IONS-SCNC: 11 MMOL/L (ref 5–18)
AST SERPL W P-5'-P-CCNC: 17 U/L (ref 0–40)
BILIRUB SERPL-MCNC: 0.5 MG/DL (ref 0–1)
BUN SERPL-MCNC: 13 MG/DL (ref 8–28)
CALCIUM SERPL-MCNC: 10.2 MG/DL (ref 8.5–10.5)
CHLORIDE BLD-SCNC: 104 MMOL/L (ref 98–107)
CHOLEST SERPL-MCNC: 242 MG/DL
CO2 SERPL-SCNC: 27 MMOL/L (ref 22–31)
CREAT SERPL-MCNC: 0.94 MG/DL (ref 0.6–1.1)
DEPRECATED CALCIDIOL+CALCIFEROL SERPL-MC: 48 UG/L (ref 20–75)
FASTING STATUS PATIENT QL REPORTED: ABNORMAL
GFR SERPL CREATININE-BSD FRML MDRD: 65 ML/MIN/1.73M2
GLUCOSE BLD-MCNC: 97 MG/DL (ref 70–125)
HDLC SERPL-MCNC: 77 MG/DL
LDLC SERPL CALC-MCNC: 133 MG/DL
POTASSIUM BLD-SCNC: 3.9 MMOL/L (ref 3.5–5)
PROT SERPL-MCNC: 7.2 G/DL (ref 6–8)
SODIUM SERPL-SCNC: 142 MMOL/L (ref 136–145)
T3 SERPL-MCNC: 78 NG/DL (ref 60–181)
T4 FREE SERPL-MCNC: 0.83 NG/DL (ref 0.7–1.8)
TRIGL SERPL-MCNC: 159 MG/DL
TSH SERPL DL<=0.005 MIU/L-ACNC: 3.66 UIU/ML (ref 0.3–5)

## 2022-04-21 ENCOUNTER — HOSPITAL ENCOUNTER (OUTPATIENT)
Dept: MAMMOGRAPHY | Facility: CLINIC | Age: 72
Discharge: HOME | End: 2022-04-21
Attending: INTERNAL MEDICINE | Admitting: INTERNAL MEDICINE
Payer: MEDICARE

## 2022-04-21 DIAGNOSIS — Z12.31 VISIT FOR SCREENING MAMMOGRAM: ICD-10-CM

## 2022-04-21 PROCEDURE — 77067 SCR MAMMO BI INCL CAD: CPT

## 2022-07-21 ENCOUNTER — TRANSFERRED RECORDS (OUTPATIENT)
Dept: HEALTH INFORMATION MANAGEMENT | Facility: CLINIC | Age: 72
End: 2022-07-21

## 2022-07-21 ENCOUNTER — OFFICE VISIT (OUTPATIENT)
Dept: INTERNAL MEDICINE | Facility: CLINIC | Age: 72
End: 2022-07-21
Payer: COMMERCIAL

## 2022-07-21 VITALS
SYSTOLIC BLOOD PRESSURE: 128 MMHG | WEIGHT: 156 LBS | BODY MASS INDEX: 26.36 KG/M2 | DIASTOLIC BLOOD PRESSURE: 68 MMHG | OXYGEN SATURATION: 98 % | HEART RATE: 80 BPM

## 2022-07-21 DIAGNOSIS — M79.89 SWELLING OF FINGER: Primary | ICD-10-CM

## 2022-07-21 PROCEDURE — 99213 OFFICE O/P EST LOW 20 MIN: CPT | Performed by: NURSE PRACTITIONER

## 2022-07-21 NOTE — PROGRESS NOTES
"  Assessment & Plan     Swelling of finger  Recently completed Keflex antibiotic for cellulitis after pricking herself with a plant thorn while gardening.    Redness has resolved and swelling has come down considerably.  Her finger no longer looks infected but is still mildly swollen.    Interestingly, she has some aching in her finger joints.  I advised NSAIDs and heat for the next 7 days.  Naproxen 500 mg twice daily.    If her cellulitic changes were to recur, recommended that she be evaluated at orthopedic urgent care for consideration of joint aspiration.    14 minutes spent on the date of the encounter doing chart review, history and exam, documentation and further activities per the note     BMI:   Estimated body mass index is 26.36 kg/m  as calculated from the following:    Height as of 4/7/22: 1.638 m (5' 4.5\").    Weight as of this encounter: 70.8 kg (156 lb).       See Patient Instructions    No follow-ups on file.    Cain Henriquez, [facility]   Ivon is a 72 year old, presenting for the following health issues:  Follow Up (Cellulitis of Rt middle finger. )      HPI     A couple of weeks ago she was visiting her daughter.  While she was doing some gardening she pricked her finger on a thorn and had abrupt onset of swelling and redness.  Diagnosed with cellulitis and started on Keflex.  Swelling and redness has come down considerably and is almost completely resolved except she does have some aching in her joints on the affected phalanx.      Review of Systems   Constitutional, HEENT, cardiovascular, pulmonary, gi and gu systems are negative, except as otherwise noted.      Objective    Wt 70.8 kg (156 lb)   BMI 26.36 kg/m    Body mass index is 26.36 kg/m .  Physical Exam     Right middle finger is slightly swollen when compared to the opposite hand but she does have full ROM with no obvious cellulitic changes      .  ..  "

## 2022-09-24 ENCOUNTER — HEALTH MAINTENANCE LETTER (OUTPATIENT)
Age: 72
End: 2022-09-24

## 2022-11-10 ENCOUNTER — HOSPITAL ENCOUNTER (OUTPATIENT)
Dept: ULTRASOUND IMAGING | Facility: CLINIC | Age: 72
Discharge: HOME | End: 2022-11-10
Attending: INTERNAL MEDICINE | Admitting: INTERNAL MEDICINE
Payer: MEDICARE

## 2022-11-10 DIAGNOSIS — E04.1 THYROID NODULE: ICD-10-CM

## 2022-11-10 PROCEDURE — 76536 US EXAM OF HEAD AND NECK: CPT

## 2022-12-30 ENCOUNTER — MYC MEDICAL ADVICE (OUTPATIENT)
Dept: INTERNAL MEDICINE | Facility: CLINIC | Age: 72
End: 2022-12-30

## 2022-12-30 NOTE — TELEPHONE ENCOUNTER
Nurse Triage SBAR    Is this a 2nd Level Triage? NO    Situation:   Patient stated tested positive for COVID-19 infection on 12/28/22.    Background:   Age 72 years old  Weight 70.8 kg (156 lbs) with BMI of 26.36 kg/m  per last OV 7/21/22    Been in [place] on 12/18; started with mild cold symptoms on 12/20/22.  On Sunday, symptoms worsen and tested on Wed and it was positive.     Assessment:   Dry Cough throughout the day  Head congestion-very congested in the head  Fatigue  No fever or chills  No SOB or difficulty breathing    Protocol Recommended Disposition:   No disposition on file.    Recommendation:   Explained to patient that d/t symptoms being outside of the 5 days period and her symptoms is not, RN unable to proceed with RN COVID Tx protocol. Then patient stated symptoms was very mild around 12/20/22 and it got worst on Sunday, but then feels like she really got hit hard on Wednesday when she decided to test for COVID-19.    Advised pt should be okay without treatment as antiviral is to help lessen the period of infection and help lessen her symptoms, which is not severe, but she is on the fence about talking to a provider about her options. Warm transferred pt to the COVID line to get a virtual appt after informing her of the quarantine guideline below:    How do I self-isolate?  You isolate when you have symptoms of COVID or a test shows you have COVID, even if you don t have symptoms.     If you DO have symptoms:  o Stay home and away from others  - For at least 5 days after your symptoms started, AND   - You are fever free for 24 hours (with no medicine that reduces fever), AND  - Your other symptoms are better.  o Wear a mask for 10 full days any time you are around others.    If you DON T have symptoms:  o Stay at home and away from others for at least 5 days after your positive test.  o Wear a mask for 10 full days any time you are around others.      Does the patient meet one of the following  criteria for ADS visit consideration? 16+ years old, with an [facility] PCP     TIP  Providers, please consider if this condition is appropriate for management at one of our Acute and Diagnostic Services sites.     If patient is a good candidate, please use dotphrase <dot>triageresponse and select Refer to ADS to document.      RN COVID TREATMENT VISIT  12/30/22    Ivon Newell  72 year old  Current weight? Weight 70.8 kg (156 lbs) with BMI of 26.36 kg/m  per last OV 7/21/22    Has the patient been seen by a primary care provider at an [facility] or [facility] within the past two years? Yes.   Have you been in close proximity to/do you have a known exposure to a person with a confirmed case of influenza? No.     Date of positive COVID test (PCR or at home)?  12/28/22    Current COVID symptoms: cough, fatigue and congestion or runny nose    Date COVID symptoms began: Mild Cold symptoms on 12/20/22 then got worst on 12/25/22.    Do you have any of the following conditions that place you at risk of being very sick from COVID-19? 65 years or older and overweight (BMI>25)    Is patient eligible to continue? No, patient has COVID symptoms started more than 5 days ago and informed he/she does not qualify for treatment.      DIANNE Dawn, RN  [facility]

## 2023-02-06 ASSESSMENT — ENCOUNTER SYMPTOMS
BREAST MASS: 0
CONSTIPATION: 0
WEAKNESS: 0
HEMATURIA: 0
DIZZINESS: 0
EYE PAIN: 0
NERVOUS/ANXIOUS: 1
MYALGIAS: 0
ARTHRALGIAS: 0
HEMATOCHEZIA: 0
PARESTHESIAS: 0
DYSURIA: 0
SHORTNESS OF BREATH: 0
FREQUENCY: 0
JOINT SWELLING: 0
FEVER: 0
SORE THROAT: 0
HEARTBURN: 0
NAUSEA: 0
CHILLS: 0
DIARRHEA: 0
ABDOMINAL PAIN: 0
COUGH: 1
HEADACHES: 0
PALPITATIONS: 0

## 2023-02-06 ASSESSMENT — ACTIVITIES OF DAILY LIVING (ADL): CURRENT_FUNCTION: NO ASSISTANCE NEEDED

## 2023-02-08 ENCOUNTER — OFFICE VISIT (OUTPATIENT)
Dept: INTERNAL MEDICINE | Facility: CLINIC | Age: 73
End: 2023-02-08
Payer: COMMERCIAL

## 2023-02-08 VITALS
DIASTOLIC BLOOD PRESSURE: 77 MMHG | SYSTOLIC BLOOD PRESSURE: 119 MMHG | TEMPERATURE: 97.4 F | BODY MASS INDEX: 26.29 KG/M2 | WEIGHT: 157.8 LBS | RESPIRATION RATE: 16 BRPM | HEART RATE: 74 BPM | HEIGHT: 65 IN | OXYGEN SATURATION: 98 %

## 2023-02-08 DIAGNOSIS — Z13.89 SCREENING FOR BLOOD OR PROTEIN IN URINE: ICD-10-CM

## 2023-02-08 DIAGNOSIS — M85.89 OSTEOPENIA OF MULTIPLE SITES: ICD-10-CM

## 2023-02-08 DIAGNOSIS — Z86.39 HISTORY OF GRAVES' DISEASE: ICD-10-CM

## 2023-02-08 DIAGNOSIS — E78.2 MIXED HYPERLIPIDEMIA: ICD-10-CM

## 2023-02-08 DIAGNOSIS — Z12.31 VISIT FOR SCREENING MAMMOGRAM: ICD-10-CM

## 2023-02-08 DIAGNOSIS — Z00.00 ENCOUNTER FOR MEDICARE ANNUAL WELLNESS EXAM: Primary | ICD-10-CM

## 2023-02-08 PROBLEM — E78.5 HYPERLIPEMIA: Status: ACTIVE | Noted: 2023-02-08

## 2023-02-08 PROBLEM — R25.1 TREMOR: Status: RESOLVED | Noted: 2020-11-04 | Resolved: 2023-02-08

## 2023-02-08 PROBLEM — R00.2 PALPITATIONS: Status: RESOLVED | Noted: 2020-11-04 | Resolved: 2023-02-08

## 2023-02-08 PROBLEM — M89.9 DISORDER OF BONE AND CARTILAGE: Status: RESOLVED | Noted: 2023-02-08 | Resolved: 2023-02-08

## 2023-02-08 PROBLEM — M94.9 DISORDER OF BONE AND CARTILAGE: Status: ACTIVE | Noted: 2023-02-08

## 2023-02-08 PROBLEM — M85.9 LOW BONE DENSITY: Status: RESOLVED | Noted: 2021-09-28 | Resolved: 2023-02-08

## 2023-02-08 PROBLEM — M89.9 DISORDER OF BONE AND CARTILAGE: Status: ACTIVE | Noted: 2023-02-08

## 2023-02-08 PROBLEM — M35.00 SJOGREN'S SYNDROME (H): Status: ACTIVE | Noted: 2023-02-08

## 2023-02-08 PROBLEM — M94.9 DISORDER OF BONE AND CARTILAGE: Status: RESOLVED | Noted: 2023-02-08 | Resolved: 2023-02-08

## 2023-02-08 LAB
ALBUMIN SERPL BCG-MCNC: 4.6 G/DL (ref 3.5–5.2)
ALBUMIN UR-MCNC: NEGATIVE MG/DL
ALP SERPL-CCNC: 92 U/L (ref 35–104)
ALT SERPL W P-5'-P-CCNC: 19 U/L (ref 10–35)
ANION GAP SERPL CALCULATED.3IONS-SCNC: 12 MMOL/L (ref 7–15)
APPEARANCE UR: CLEAR
AST SERPL W P-5'-P-CCNC: 28 U/L (ref 10–35)
BILIRUB SERPL-MCNC: 0.4 MG/DL
BILIRUB UR QL STRIP: NEGATIVE
BUN SERPL-MCNC: 16.3 MG/DL (ref 8–23)
CALCIUM SERPL-MCNC: 10.5 MG/DL (ref 8.8–10.2)
CHLORIDE SERPL-SCNC: 103 MMOL/L (ref 98–107)
CHOLEST SERPL-MCNC: 224 MG/DL
COLOR UR AUTO: YELLOW
CREAT SERPL-MCNC: 0.99 MG/DL (ref 0.51–0.95)
DEPRECATED HCO3 PLAS-SCNC: 26 MMOL/L (ref 22–29)
ERYTHROCYTE [DISTWIDTH] IN BLOOD BY AUTOMATED COUNT: 12.9 % (ref 10–15)
GFR SERPL CREATININE-BSD FRML MDRD: 61 ML/MIN/1.73M2
GLUCOSE SERPL-MCNC: 99 MG/DL (ref 70–99)
GLUCOSE UR STRIP-MCNC: NEGATIVE MG/DL
HCT VFR BLD AUTO: 39.2 % (ref 35–47)
HDLC SERPL-MCNC: 80 MG/DL
HGB BLD-MCNC: 13.1 G/DL (ref 11.7–15.7)
HGB UR QL STRIP: NEGATIVE
KETONES UR STRIP-MCNC: NEGATIVE MG/DL
LDLC SERPL CALC-MCNC: 114 MG/DL
LEUKOCYTE ESTERASE UR QL STRIP: NEGATIVE
MCH RBC QN AUTO: 30.5 PG (ref 26.5–33)
MCHC RBC AUTO-ENTMCNC: 33.4 G/DL (ref 31.5–36.5)
MCV RBC AUTO: 91 FL (ref 78–100)
NITRATE UR QL: NEGATIVE
NONHDLC SERPL-MCNC: 144 MG/DL
PH UR STRIP: 7 [PH] (ref 5–8)
PLATELET # BLD AUTO: 218 10E3/UL (ref 150–450)
POTASSIUM SERPL-SCNC: 4.8 MMOL/L (ref 3.4–5.3)
PROT SERPL-MCNC: 7.7 G/DL (ref 6.4–8.3)
RBC # BLD AUTO: 4.29 10E6/UL (ref 3.8–5.2)
SODIUM SERPL-SCNC: 141 MMOL/L (ref 136–145)
SP GR UR STRIP: 1.01 (ref 1–1.03)
TRIGL SERPL-MCNC: 148 MG/DL
TSH SERPL DL<=0.005 MIU/L-ACNC: 3.69 UIU/ML (ref 0.3–4.2)
UROBILINOGEN UR STRIP-ACNC: 0.2 E.U./DL
WBC # BLD AUTO: 5 10E3/UL (ref 4–11)

## 2023-02-08 PROCEDURE — 81003 URINALYSIS AUTO W/O SCOPE: CPT | Performed by: INTERNAL MEDICINE

## 2023-02-08 PROCEDURE — 80053 COMPREHEN METABOLIC PANEL: CPT | Performed by: INTERNAL MEDICINE

## 2023-02-08 PROCEDURE — 80061 LIPID PANEL: CPT | Performed by: INTERNAL MEDICINE

## 2023-02-08 PROCEDURE — G0439 PPPS, SUBSEQ VISIT: HCPCS | Performed by: INTERNAL MEDICINE

## 2023-02-08 PROCEDURE — 36415 COLL VENOUS BLD VENIPUNCTURE: CPT | Performed by: INTERNAL MEDICINE

## 2023-02-08 PROCEDURE — 99213 OFFICE O/P EST LOW 20 MIN: CPT | Mod: 25 | Performed by: INTERNAL MEDICINE

## 2023-02-08 PROCEDURE — 85027 COMPLETE CBC AUTOMATED: CPT | Performed by: INTERNAL MEDICINE

## 2023-02-08 PROCEDURE — 82306 VITAMIN D 25 HYDROXY: CPT | Performed by: INTERNAL MEDICINE

## 2023-02-08 PROCEDURE — 84443 ASSAY THYROID STIM HORMONE: CPT | Performed by: INTERNAL MEDICINE

## 2023-02-08 RX ORDER — CHLORAL HYDRATE 500 MG
CAPSULE ORAL
COMMUNITY
Start: 2022-10-01

## 2023-02-08 ASSESSMENT — ENCOUNTER SYMPTOMS
DIARRHEA: 0
HEMATOCHEZIA: 0
MYALGIAS: 0
DYSURIA: 0
DIZZINESS: 0
FEVER: 0
CONSTIPATION: 0
NERVOUS/ANXIOUS: 1
SHORTNESS OF BREATH: 0
ABDOMINAL PAIN: 0
BREAST MASS: 0
HEADACHES: 0
FREQUENCY: 0
JOINT SWELLING: 0
PALPITATIONS: 0
COUGH: 1
CHILLS: 0
EYE PAIN: 0
HEARTBURN: 0
PARESTHESIAS: 0
SORE THROAT: 0
WEAKNESS: 0
NAUSEA: 0
ARTHRALGIAS: 0
HEMATURIA: 0

## 2023-02-08 ASSESSMENT — ACTIVITIES OF DAILY LIVING (ADL): CURRENT_FUNCTION: NO ASSISTANCE NEEDED

## 2023-02-08 NOTE — PATIENT INSTRUCTIONS
Labs today.    To schedule mammogram and DXA scan in 4/2023.  Patient Education   Personalized Prevention Plan  You are due for the preventive services outlined below.  Your care team is available to assist you in scheduling these services.  If you have already completed any of these items, please share that information with your care team to update in your medical record.  Health Maintenance Due   Topic Date Due    ANNUAL REVIEW OF HM ORDERS  Never done    COVID-19 Vaccine (5 - Booster for Pfizer series) 06/02/2022    Flu Vaccine (1) 09/01/2022       Urinary Incontinence, Female (Adult)   Urinary incontinence means loss of bladder control. This problem affects many women, especially as they get older. If you have incontinence, you may be embarrassed to ask for help. But know that this problem can be treated.   Types of Incontinence  There are different types of incontinence. Two of the main types are described here. You can have more than one type.   Stress incontinence. With this type, urine leaks when pressure (stress) is put on the bladder. This may happen when you cough, sneeze, or laugh. Stress incontinence most often occurs because the pelvic floor muscles that support the bladder and urethra are weak. This can happen after pregnancy and vaginal childbirth or a hysterectomy. It can also be due to excess body weight or hormone changes.  Urge incontinence (also called overactive bladder). With this type, a sudden urge to urinate is felt often. This may happen even though there may not be much urine in the bladder. The need to urinate often during the night is common. Urge incontinence most often occurs because of bladder spasms. This may be due to bladder irritation or infection. Damage to bladder nerves or pelvic muscles, constipation, and certain medicines can also lead to urge incontinence.  Treatment depends on the cause. Further evaluation is needed to find the type you have. This will likely include an  exam and certain tests. Based on the results, you and your healthcare provider can then plan treatment. Until a diagnosis is made, the home care tips below can help ease symptoms.   Home care  Do pelvic floor muscle exercises, if they are prescribed. The pelvic floor muscles help support the bladder and urethra. Many women find that their symptoms improve when doing special exercises that strengthen these muscles. To do the exercises, contract the muscles you would use to stop your stream of urine. But do this when you re not urinating. Hold for 10 seconds, then relax. Repeat 10 to 20 times in a row, at least 3 times a day. Your healthcare provider may give you other instructions for how to do the exercises and how often.  Keep a bladder diary. This helps track how often and how much you urinate over a set period of time. Bring this diary with you to your next visit with the provider. The information can help your provider learn more about your bladder problem.  Lose weight, if advised to by your provider. Extra weight puts pressure on the bladder. Your provider can help you create a weight-loss plan that s right for you. This may include exercising more and making certain diet changes.  Don't have foods and drinks that may irritate the bladder. These can include alcohol and caffeinated drinks.  Quit smoking. Smoking and other tobacco use can lead to a long-term (chronic) cough that strains the pelvic floor muscles. Smoking may also damage the bladder and urethra. Talk with your provider about treatments or methods you can use to quit smoking.  If drinking large amounts of fluid makes you have symptoms, you may be advised to limit your fluid intake. You may also be advised to drink most of your fluids during the day and to limit fluids at night.  If you re worried about urine leakage or accidents, you may wear absorbent pads to catch urine. Change the pads often. This helps reduce discomfort. It may also reduce the  risk of skin or bladder infections.    Follow-up care  Follow up with your healthcare provider, or as directed. It may take some to find the right treatment for your problem. But healthy lifestyle changes can be made right away. These include such things as exercising on a regular basis, eating a healthy diet, losing weight (if needed), and quitting smoking. Your treatment plan may include special therapies or medicines. Certain procedures or surgery may also be options. Talk about any questions you have with your provider.   When to seek medical advice  Call the healthcare provider right away if any of these occur:  Fever of 100.4 F (38 C) or higher, or as directed by your provider  Bladder pain or fullness  Belly swelling  Nausea or vomiting  Back pain  Weakness, dizziness, or fainting  [employer] last reviewed this educational content on 1/1/2020 2000-2021 [employer]. All rights reserved. This information is not intended as a substitute for professional medical care. Always follow your healthcare professional's instructions.

## 2023-02-08 NOTE — PROGRESS NOTES
"SUBJECTIVE:   Ivon is a 73 year old who presents for Preventive Visit.    Patient has been advised of split billing requirements and indicates understanding: Yes  Are you in the first 12 months of your Medicare coverage?  No    Healthy Habits:     In general, how would you rate your overall health?  Excellent    Frequency of exercise:  6-7 days/week    Duration of exercise:  30-45 minutes    Do you usually eat at least 4 servings of fruit and vegetables a day, include whole grains    & fiber and avoid regularly eating high fat or \"junk\" foods?  Yes    Taking medications regularly:  Yes    Medication side effects:  None    Ability to successfully perform activities of daily living:  No assistance needed    Home Safety:  No safety concerns identified    Hearing Impairment:  No hearing concerns    In the past 6 months, have you been bothered by leaking of urine? Yes    In general, how would you rate your overall mental or emotional health?  Excellent      PHQ-2 Total Score: 0    Additional concerns today:  Yes      Have you ever done Advance Care Planning? (For example, a Health Directive, POLST, or a discussion with a medical provider or your loved ones about your wishes): Yes, advance care planning is on file.       Fall risk  Fallen 2 or more times in the past year?: No  Any fall with injury in the past year?: No    Cognitive Screening   1) Repeat 3 items (Leader, Season, Table)    2) Clock draw: NORMAL  3) 3 item recall: Recalls 3 objects  Results: NORMAL clock, 1-2 items recalled: COGNITIVE IMPAIRMENT LESS LIKELY    Mini-CogTM Copyright [employer]. Licensed by the author for use in [facility]; reprinted with permission (trisha@.[facility]). All rights reserved.      Do you have sleep apnea, excessive snoring or daytime drowsiness?: no    Reviewed and updated as needed this visit by clinical staff   Tobacco  Allergies  Meds              Reviewed and updated as needed this visit by Provider               "   Social History     Tobacco Use     Smoking status: Never     Smokeless tobacco: Never   Substance Use Topics     Alcohol use: Yes     Alcohol/week: 3.0 standard drinks     If you drink alcohol do you typically have >3 drinks per day or >7 drinks per week? No    Alcohol Use 2/6/2023   Prescreen: >3 drinks/day or >7 drinks/week? No               Current providers sharing in care for this patient include:   Patient Care Team:  Errol Henderson MD as PCP - General (Internal Medicine)  Christiane Montero MD as MD (INTERNAL MEDICINE - ENDOCRINOLOGY, DIABETES & METABOLISM)  Christiane Montero MD as Assigned Endocrinology Provider  Errol Henderson MD as Assigned PCP    The following health maintenance items are reviewed in Epic and correct as of today:  Health Maintenance   Topic Date Due     ANNUAL REVIEW OF HM ORDERS  Never done     COVID-19 Vaccine (5 - Booster for Pfizer series) 06/02/2022     INFLUENZA VACCINE (1) 09/01/2022     MEDICARE ANNUAL WELLNESS VISIT  04/07/2023     MAMMO SCREENING  04/21/2023     FALL RISK ASSESSMENT  02/08/2024     COLORECTAL CANCER SCREENING  08/23/2024     LIPID  04/07/2027     ADVANCE CARE PLANNING  04/07/2027     DTAP/TDAP/TD IMMUNIZATION (4 - Td or Tdap) 09/04/2028     DEXA  04/15/2036     HEPATITIS C SCREENING  Completed     PHQ-2 (once per calendar year)  Completed     Pneumococcal Vaccine: 65+ Years  Completed     ZOSTER IMMUNIZATION  Completed     IPV IMMUNIZATION  Aged Out     MENINGITIS IMMUNIZATION  Aged Out               Review of Systems   Constitutional: Negative for chills and fever.   HENT: Negative for congestion, ear pain, hearing loss and sore throat.    Eyes: Negative for pain and visual disturbance.   Respiratory: Positive for cough. Negative for shortness of breath.    Cardiovascular: Negative for chest pain, palpitations and peripheral edema.   Gastrointestinal: Negative for abdominal pain, constipation, diarrhea, heartburn, hematochezia and nausea.   Breasts:   "Negative for tenderness, breast mass and discharge.   Genitourinary: Negative for dysuria, frequency, genital sores, hematuria, pelvic pain, urgency, vaginal bleeding and vaginal discharge.   Musculoskeletal: Negative for arthralgias, joint swelling and myalgias.   Skin: Negative for rash.   Neurological: Negative for dizziness, weakness, headaches and paresthesias.   Psychiatric/Behavioral: Negative for mood changes. The patient is nervous/anxious.          OBJECTIVE:   /77   Pulse 74   Temp 97.4  F (36.3  C)   Resp 16   Ht 1.638 m (5' 4.5\")   Wt 71.6 kg (157 lb 12.8 oz)   SpO2 98%   BMI 26.67 kg/m   Estimated body mass index is 26.67 kg/m  as calculated from the following:    Height as of this encounter: 1.638 m (5' 4.5\").    Weight as of this encounter: 71.6 kg (157 lb 12.8 oz).  Physical Exam  General Appearance: Alert, cooperative, no distress, appears stated age.  Head: Normocephalic, without obvious abnormality, atraumatic  Eyes: PERRL, conjunctiva/corneas clear, EOM's intact  Ears: Normal TM's and external ear canals, both ears  Nose: Nares normal, septum midline,mucosa normal, no drainage  Throat: Lips, mucosa, and tongue normal; teeth and gums normal  Neck: Supple, symmetrical, trachea midline, no adenopathy;  thyroid: not enlarged, symmetric, no tenderness/mass/nodules; no carotid bruit or JVD  Back: Symmetric, no curvature, ROM normal, no CVA tenderness.  Lungs: Clear to auscultation bilaterally, respirations unlabored.  Breasts: No breast masses, tenderness, asymmetry, or nipple discharge.  Heart: Regular rate and rhythm, S1 and S2 normal, no murmur, rub, or gallop.  Abdomen: Soft, non-tender, bowel sounds active all four quadrants,  no masses, no organomegaly.  Extremities: Extremities normal, atraumatic, no cyanosis or edema.  Skin: Skin color, texture, turgor normal, no rashes or lesions.  Lymph nodes: Cervical, supraclavicular, and axillary nodes normal.  Neurologic: No focal " "neurological findings.          ASSESSMENT / PLAN:   (Z00.00) Encounter for Medicare annual wellness exam  (primary encounter diagnosis)      (Z86.39) History of Graves' disease  Comment: Diagnosed in 2020, was on methimazole 11/2020-3/2021.Multiple labs since then have been normal. She may be at risk for recurrence anytime in her life.   Plan: TSH with free T4 reflex            (M85.89) Osteopenia of multiple sites  Comment: due for DXA in April  Plan: DX Hip/Pelvis/Spine, Vitamin D Deficiency            (E78.2) Mixed hyperlipidemia  Comment: on statin  Plan: CBC with platelets, Comprehensive metabolic         panel, Lipid panel reflex to direct LDL Fasting            (Z12.31) Visit for screening mammogram  Comment:   Plan: MA Screen Bilateral w/Venkata            (Z13.89) Screening for blood or protein in urine  Comment:   Plan: UA reflex to Microscopic and Culture              Patient has been advised of split billing requirements and indicates understanding: Yes      COUNSELING:  Reviewed preventive health counseling, as reflected in patient instructions       Regular exercise       Healthy diet/nutrition      BMI:   Estimated body mass index is 26.67 kg/m  as calculated from the following:    Height as of this encounter: 1.638 m (5' 4.5\").    Weight as of this encounter: 71.6 kg (157 lb 12.8 oz).         She reports that she has never smoked. She has never used smokeless tobacco.      Appropriate preventive services were discussed with this patient, including applicable screening as appropriate for cardiovascular disease, diabetes, osteopenia/osteoporosis, and glaucoma.  As appropriate for age/gender, discussed screening for colorectal cancer, prostate cancer, breast cancer, and cervical cancer. Checklist reviewing preventive services available has been given to the patient.    Reviewed patients plan of care and provided an AVS. The Basic Care Plan (routine screening as documented in Health Maintenance) for Ivon" meets the Care Plan requirement. This Care Plan has been established and reviewed with the Patient.      Errol Henderson MD  [facility]    Identified Health Risks:

## 2023-02-09 LAB — DEPRECATED CALCIDIOL+CALCIFEROL SERPL-MC: 51 UG/L (ref 20–75)

## 2023-03-28 ENCOUNTER — MYC MEDICAL ADVICE (OUTPATIENT)
Dept: INTERNAL MEDICINE | Facility: CLINIC | Age: 73
End: 2023-03-28
Payer: COMMERCIAL

## 2023-03-28 DIAGNOSIS — F41.9 ANXIETY: ICD-10-CM

## 2023-03-28 DIAGNOSIS — E78.00 HYPERCHOLESTEROLEMIA: ICD-10-CM

## 2023-03-28 RX ORDER — PAROXETINE 20 MG/1
20 TABLET, FILM COATED ORAL DAILY
Qty: 90 TABLET | Refills: 3 | Status: SHIPPED | OUTPATIENT
Start: 2023-03-28 | End: 2024-05-07

## 2023-03-28 RX ORDER — SIMVASTATIN 40 MG
40 TABLET ORAL DAILY
Qty: 90 TABLET | Refills: 3 | Status: SHIPPED | OUTPATIENT
Start: 2023-03-28 | End: 2023-04-10

## 2023-03-28 NOTE — TELEPHONE ENCOUNTER
"Last Written Prescription Date:  4/19/22  Last Fill Quantity: 90,  # refills: 2 for paroxetine and 3 for simvastatin   Last office visit provider:  2/8/23  Prescription approved per [facility] Refill Protocol.    Requested Prescriptions   Pending Prescriptions Disp Refills     simvastatin (ZOCOR) 40 MG tablet 90 tablet 3     Sig: Take 1 tablet (40 mg) by mouth daily       Statins Protocol Passed - 3/28/2023 11:23 AM        Passed - LDL on file in past 12 months     Recent Labs   Lab Test 02/08/23  1339   *             Passed - No abnormal creatine kinase in past 12 months     No lab results found.             Passed - Recent (12 mo) or future (30 days) visit within the authorizing provider's specialty     Patient has had an office visit with the authorizing provider or a provider within the authorizing providers department within the previous 12 mos or has a future within next 30 days. See \"Patient Info\" tab in [employer], or \"Choose Columns\" in Meds & Orders section of the refill encounter.              Passed - Medication is active on med list        Passed - Patient is age 18 or older        Passed - No active pregnancy on record        Passed - No positive pregnancy test in past 12 months           PARoxetine (PAXIL) 20 MG tablet 90 tablet 2     Sig: Take 1 tablet (20 mg) by mouth daily       SSRIs Protocol Passed - 3/28/2023 11:23 AM        Passed - Recent (12 mo) or future (30 days) visit within the authorizing provider's specialty     Patient has had an office visit with the authorizing provider or a provider within the authorizing providers department within the previous 12 mos or has a future within next 30 days. See \"Patient Info\" tab in [employer], or \"Choose Columns\" in Meds & Orders section of the refill encounter.              Passed - Medication is active on med list        Passed - Patient is age 18 or older        Passed - No active pregnancy on record        Passed - No positive pregnancy test in " last 12 months             Katelynn Reich RN 03/28/23 11:23 AM

## 2023-04-10 DIAGNOSIS — E78.00 HYPERCHOLESTEROLEMIA: ICD-10-CM

## 2023-04-10 RX ORDER — SIMVASTATIN 40 MG
40 TABLET ORAL DAILY
Qty: 90 TABLET | Refills: 2 | Status: SHIPPED | OUTPATIENT
Start: 2023-04-10 | End: 2024-04-08

## 2023-04-11 DIAGNOSIS — F41.9 ANXIETY: ICD-10-CM

## 2023-04-12 RX ORDER — PAROXETINE 20 MG/1
TABLET, FILM COATED ORAL
Qty: 90 TABLET | Refills: 2 | OUTPATIENT
Start: 2023-04-12

## 2023-04-12 NOTE — TELEPHONE ENCOUNTER
"Last Written Prescription Date:  3/28/23  Last Fill Quantity: 90,  # refills: 3   Last office visit provider:  2/8/23, PCP     Requested Prescriptions   Pending Prescriptions Disp Refills     PARoxetine (PAXIL) 20 MG tablet [Pharmacy Med Name: PAROXETINE HCL 20 MG TABLET] 90 tablet 2     Sig: TAKE 1 TABLET BY MOUTH EVERY DAY       SSRIs Protocol Passed - 4/11/2023 12:53 AM        Passed - Recent (12 mo) or future (30 days) visit within the authorizing provider's specialty     Patient has had an office visit with the authorizing provider or a provider within the authorizing providers department within the previous 12 mos or has a future within next 30 days. See \"Patient Info\" tab in inbasket, or \"Choose Columns\" in Meds & Orders section of the refill encounter.              Passed - Medication is active on med list        Passed - Patient is age 18 or older        Passed - No active pregnancy on record        Passed - No positive pregnancy test in last 12 months             Renetta Canales RN 04/12/23 2:31 AM  "

## 2023-04-17 ENCOUNTER — ANCILLARY PROCEDURE (OUTPATIENT)
Dept: BONE DENSITY | Facility: CLINIC | Age: 73
End: 2023-04-17
Attending: INTERNAL MEDICINE
Payer: COMMERCIAL

## 2023-04-17 DIAGNOSIS — M85.89 OSTEOPENIA OF MULTIPLE SITES: ICD-10-CM

## 2023-04-17 PROCEDURE — 77080 DXA BONE DENSITY AXIAL: CPT | Mod: TC | Performed by: RADIOLOGY

## 2023-04-24 ENCOUNTER — HOSPITAL ENCOUNTER (OUTPATIENT)
Dept: MAMMOGRAPHY | Facility: CLINIC | Age: 73
Discharge: HOME | End: 2023-04-24
Attending: INTERNAL MEDICINE | Admitting: INTERNAL MEDICINE
Payer: MEDICARE

## 2023-04-24 DIAGNOSIS — Z12.31 VISIT FOR SCREENING MAMMOGRAM: ICD-10-CM

## 2023-04-24 PROCEDURE — 77067 SCR MAMMO BI INCL CAD: CPT

## 2023-07-27 ENCOUNTER — TRANSFERRED RECORDS (OUTPATIENT)
Dept: HEALTH INFORMATION MANAGEMENT | Facility: CLINIC | Age: 73
End: 2023-07-27
Payer: COMMERCIAL

## 2023-12-08 ENCOUNTER — NURSE TRIAGE (OUTPATIENT)
Dept: INTERNAL MEDICINE | Facility: CLINIC | Age: 73
End: 2023-12-08
Payer: COMMERCIAL

## 2023-12-08 NOTE — TELEPHONE ENCOUNTER
Nurse Triage SBAR    Is this a 2nd Level Triage? YES, however, patient reports intent to go to [facility] today.    Situation:       Background: 72yo female, dx osteopenia    Assessment:  Patient needs eval, imaging to determine if fracture or sprain. Orthopedic walk-in would be ideal.    Protocol Recommended Disposition:   See in Office Today or Tomorrow    Recommendation: Advised patient to go to orthopedic walk-in/urgent care.  Provided phone number for [facility].     Routed to provider    Does the patient meet one of the following criteria for ADS visit consideration? 16+ years old, with an [facility] PCP     TIP  Providers, please consider if this condition is appropriate for management at one of our Acute and Diagnostic Services sites.     If patient is a good candidate, please use dotphrase <dot>triageresponse and select Refer to ADS to document.    Reason for Disposition   MODERATE pain (e.g., interferes with normal activities) and high-risk adult (e.g., age > 60 years, osteoporosis, chronic steroid use)    Additional Information   Negative: Major bleeding (actively dripping or spurting) that can't be stopped   Negative: Serious injury with multiple fractures (broken bones)   Negative: Sounds like a life-threatening emergency to the triager   Negative: Wound looks infected   Negative: Arm pain from overuse (e.g., sports, lifting, physical work)   Negative: Arm pain not from an injury   Negative: Shoulder injury is main concern   Negative: Elbow injury is main concern   Negative: Hand or wrist injury is main concern   Negative: Bullet wound, stabbed by knife, or other serious penetrating wound   Negative: Looks like a broken bone or dislocated joint (crooked or deformed)   Negative: Can't move injured arm at all   Negative: Bleeding won't stop after 10 minutes of direct pressure (using correct technique)   Negative: Skin is split open or gaping (or length > 1/2 inch or 12 mm)    Negative: Dirt in the wound and not removed after 15 minutes of scrubbing   Negative: Sounds like a serious injury to the triager   Negative: SEVERE pain   Negative: Can't move injured arm normally (bend or straighten completely)   Negative: Large swelling or bruise and size > palm of person's hand   Negative: No prior tetanus shots (or is not fully vaccinated) and any wound (e.g., cut or scrape)   Negative: HIV positive or severe immunodeficiency (severely weak immune system) and DIRTY cut or scrape   Negative: Can't move injured arm normally (bend or straighten completely)    Protocols used: Arm Injury-A-OH

## 2023-12-11 ENCOUNTER — TRANSFERRED RECORDS (OUTPATIENT)
Dept: HEALTH INFORMATION MANAGEMENT | Facility: CLINIC | Age: 73
End: 2023-12-11
Payer: COMMERCIAL

## 2023-12-18 ENCOUNTER — TRANSFERRED RECORDS (OUTPATIENT)
Dept: HEALTH INFORMATION MANAGEMENT | Facility: CLINIC | Age: 73
End: 2023-12-18
Payer: COMMERCIAL

## 2023-12-27 ENCOUNTER — TRANSFERRED RECORDS (OUTPATIENT)
Dept: HEALTH INFORMATION MANAGEMENT | Facility: CLINIC | Age: 73
End: 2023-12-27
Payer: COMMERCIAL

## 2024-01-11 ENCOUNTER — TRANSFERRED RECORDS (OUTPATIENT)
Dept: HEALTH INFORMATION MANAGEMENT | Facility: CLINIC | Age: 74
End: 2024-01-11
Payer: COMMERCIAL

## 2024-01-22 ENCOUNTER — TRANSFERRED RECORDS (OUTPATIENT)
Dept: HEALTH INFORMATION MANAGEMENT | Facility: CLINIC | Age: 74
End: 2024-01-22
Payer: COMMERCIAL

## 2024-03-04 ENCOUNTER — TRANSFERRED RECORDS (OUTPATIENT)
Dept: HEALTH INFORMATION MANAGEMENT | Facility: CLINIC | Age: 74
End: 2024-03-04
Payer: COMMERCIAL

## 2024-03-27 SDOH — HEALTH STABILITY: PHYSICAL HEALTH: ON AVERAGE, HOW MANY DAYS PER WEEK DO YOU ENGAGE IN MODERATE TO STRENUOUS EXERCISE (LIKE A BRISK WALK)?: 6 DAYS

## 2024-03-27 SDOH — HEALTH STABILITY: PHYSICAL HEALTH: ON AVERAGE, HOW MANY MINUTES DO YOU ENGAGE IN EXERCISE AT THIS LEVEL?: 40 MIN

## 2024-03-27 ASSESSMENT — SOCIAL DETERMINANTS OF HEALTH (SDOH): HOW OFTEN DO YOU GET TOGETHER WITH FRIENDS OR RELATIVES?: ONCE A WEEK

## 2024-04-07 SDOH — HEALTH STABILITY: PHYSICAL HEALTH: ON AVERAGE, HOW MANY DAYS PER WEEK DO YOU ENGAGE IN MODERATE TO STRENUOUS EXERCISE (LIKE A BRISK WALK)?: 6 DAYS

## 2024-04-07 SDOH — HEALTH STABILITY: PHYSICAL HEALTH: ON AVERAGE, HOW MANY MINUTES DO YOU ENGAGE IN EXERCISE AT THIS LEVEL?: 30 MIN

## 2024-04-07 ASSESSMENT — SOCIAL DETERMINANTS OF HEALTH (SDOH): HOW OFTEN DO YOU GET TOGETHER WITH FRIENDS OR RELATIVES?: ONCE A WEEK

## 2024-04-08 ENCOUNTER — ANCILLARY PROCEDURE (OUTPATIENT)
Dept: GENERAL RADIOLOGY | Facility: CLINIC | Age: 74
End: 2024-04-08
Attending: INTERNAL MEDICINE
Payer: COMMERCIAL

## 2024-04-08 ENCOUNTER — OFFICE VISIT (OUTPATIENT)
Dept: INTERNAL MEDICINE | Facility: CLINIC | Age: 74
End: 2024-04-08
Payer: COMMERCIAL

## 2024-04-08 VITALS
TEMPERATURE: 98.9 F | HEIGHT: 65 IN | HEART RATE: 91 BPM | WEIGHT: 149.2 LBS | BODY MASS INDEX: 24.86 KG/M2 | DIASTOLIC BLOOD PRESSURE: 70 MMHG | SYSTOLIC BLOOD PRESSURE: 116 MMHG | OXYGEN SATURATION: 97 % | RESPIRATION RATE: 16 BRPM

## 2024-04-08 DIAGNOSIS — Z12.31 VISIT FOR SCREENING MAMMOGRAM: ICD-10-CM

## 2024-04-08 DIAGNOSIS — M54.42 ACUTE LEFT-SIDED LOW BACK PAIN WITH LEFT-SIDED SCIATICA: ICD-10-CM

## 2024-04-08 DIAGNOSIS — Z29.11 NEED FOR VACCINATION AGAINST RESPIRATORY SYNCYTIAL VIRUS: ICD-10-CM

## 2024-04-08 DIAGNOSIS — E78.00 HYPERCHOLESTEROLEMIA: ICD-10-CM

## 2024-04-08 DIAGNOSIS — F41.9 ANXIETY: ICD-10-CM

## 2024-04-08 DIAGNOSIS — R31.0 GROSS HEMATURIA: ICD-10-CM

## 2024-04-08 DIAGNOSIS — R79.9 ABNORMAL FINDING OF BLOOD CHEMISTRY, UNSPECIFIED: ICD-10-CM

## 2024-04-08 DIAGNOSIS — E78.2 MIXED HYPERLIPIDEMIA: ICD-10-CM

## 2024-04-08 DIAGNOSIS — Z86.39 HISTORY OF GRAVES' DISEASE: ICD-10-CM

## 2024-04-08 DIAGNOSIS — Z13.228 SCREENING FOR ENDOCRINE, METABOLIC AND IMMUNITY DISORDER: ICD-10-CM

## 2024-04-08 DIAGNOSIS — M35.00 SJOGREN'S SYNDROME, WITH UNSPECIFIED ORGAN INVOLVEMENT (H): ICD-10-CM

## 2024-04-08 DIAGNOSIS — Z00.00 ENCOUNTER FOR ANNUAL WELLNESS EXAM IN MEDICARE PATIENT: Primary | ICD-10-CM

## 2024-04-08 DIAGNOSIS — Z13.0 SCREENING FOR ENDOCRINE, METABOLIC AND IMMUNITY DISORDER: ICD-10-CM

## 2024-04-08 DIAGNOSIS — Z12.11 SPECIAL SCREENING FOR MALIGNANT NEOPLASMS, COLON: ICD-10-CM

## 2024-04-08 DIAGNOSIS — Z13.29 SCREENING FOR ENDOCRINE, METABOLIC AND IMMUNITY DISORDER: ICD-10-CM

## 2024-04-08 DIAGNOSIS — M85.89 OSTEOPENIA OF MULTIPLE SITES: ICD-10-CM

## 2024-04-08 LAB
ALBUMIN SERPL BCG-MCNC: 4.6 G/DL (ref 3.5–5.2)
ALBUMIN UR-MCNC: NEGATIVE MG/DL
ALP SERPL-CCNC: 89 U/L (ref 40–150)
ALT SERPL W P-5'-P-CCNC: 15 U/L (ref 0–50)
ANION GAP SERPL CALCULATED.3IONS-SCNC: 10 MMOL/L (ref 7–15)
APPEARANCE UR: CLEAR
AST SERPL W P-5'-P-CCNC: 22 U/L (ref 0–45)
BACTERIA #/AREA URNS HPF: ABNORMAL /HPF
BILIRUB SERPL-MCNC: 0.4 MG/DL
BILIRUB UR QL STRIP: NEGATIVE
BUN SERPL-MCNC: 15.2 MG/DL (ref 8–23)
CALCIUM SERPL-MCNC: 10.1 MG/DL (ref 8.8–10.2)
CHLORIDE SERPL-SCNC: 103 MMOL/L (ref 98–107)
CHOLEST SERPL-MCNC: 218 MG/DL
COLOR UR AUTO: YELLOW
CREAT SERPL-MCNC: 0.98 MG/DL (ref 0.51–0.95)
DEPRECATED HCO3 PLAS-SCNC: 27 MMOL/L (ref 22–29)
EGFRCR SERPLBLD CKD-EPI 2021: 61 ML/MIN/1.73M2
ERYTHROCYTE [DISTWIDTH] IN BLOOD BY AUTOMATED COUNT: 13 % (ref 10–15)
FASTING STATUS PATIENT QL REPORTED: YES
GLUCOSE SERPL-MCNC: 105 MG/DL (ref 70–99)
GLUCOSE UR STRIP-MCNC: NEGATIVE MG/DL
HBA1C MFR BLD: 5.5 % (ref 0–5.6)
HCT VFR BLD AUTO: 39.3 % (ref 35–47)
HDLC SERPL-MCNC: 80 MG/DL
HGB BLD-MCNC: 13.1 G/DL (ref 11.7–15.7)
HGB UR QL STRIP: ABNORMAL
KETONES UR STRIP-MCNC: NEGATIVE MG/DL
LDLC SERPL CALC-MCNC: 106 MG/DL
LEUKOCYTE ESTERASE UR QL STRIP: ABNORMAL
MCH RBC QN AUTO: 30.8 PG (ref 26.5–33)
MCHC RBC AUTO-ENTMCNC: 33.3 G/DL (ref 31.5–36.5)
MCV RBC AUTO: 93 FL (ref 78–100)
NITRATE UR QL: NEGATIVE
NONHDLC SERPL-MCNC: 138 MG/DL
PH UR STRIP: 7 [PH] (ref 5–8)
PLATELET # BLD AUTO: 202 10E3/UL (ref 150–450)
POTASSIUM SERPL-SCNC: 4.2 MMOL/L (ref 3.4–5.3)
PROT SERPL-MCNC: 7.5 G/DL (ref 6.4–8.3)
RBC # BLD AUTO: 4.25 10E6/UL (ref 3.8–5.2)
RBC #/AREA URNS AUTO: ABNORMAL /HPF
SODIUM SERPL-SCNC: 140 MMOL/L (ref 135–145)
SP GR UR STRIP: 1.02 (ref 1–1.03)
SQUAMOUS #/AREA URNS AUTO: ABNORMAL /LPF
TRIGL SERPL-MCNC: 160 MG/DL
TSH SERPL DL<=0.005 MIU/L-ACNC: 3.48 UIU/ML (ref 0.3–4.2)
UROBILINOGEN UR STRIP-ACNC: 0.2 E.U./DL
VIT D+METAB SERPL-MCNC: 54 NG/ML (ref 20–50)
WBC # BLD AUTO: 5 10E3/UL (ref 4–11)
WBC #/AREA URNS AUTO: ABNORMAL /HPF

## 2024-04-08 PROCEDURE — 80053 COMPREHEN METABOLIC PANEL: CPT | Performed by: INTERNAL MEDICINE

## 2024-04-08 PROCEDURE — G0439 PPPS, SUBSEQ VISIT: HCPCS | Performed by: INTERNAL MEDICINE

## 2024-04-08 PROCEDURE — 36415 COLL VENOUS BLD VENIPUNCTURE: CPT | Performed by: INTERNAL MEDICINE

## 2024-04-08 PROCEDURE — 81001 URINALYSIS AUTO W/SCOPE: CPT | Performed by: INTERNAL MEDICINE

## 2024-04-08 PROCEDURE — 82306 VITAMIN D 25 HYDROXY: CPT | Performed by: INTERNAL MEDICINE

## 2024-04-08 PROCEDURE — 83036 HEMOGLOBIN GLYCOSYLATED A1C: CPT | Performed by: INTERNAL MEDICINE

## 2024-04-08 PROCEDURE — 80061 LIPID PANEL: CPT | Performed by: INTERNAL MEDICINE

## 2024-04-08 PROCEDURE — 72100 X-RAY EXAM L-S SPINE 2/3 VWS: CPT | Performed by: RADIOLOGY

## 2024-04-08 PROCEDURE — 85027 COMPLETE CBC AUTOMATED: CPT | Performed by: INTERNAL MEDICINE

## 2024-04-08 PROCEDURE — 84443 ASSAY THYROID STIM HORMONE: CPT | Performed by: INTERNAL MEDICINE

## 2024-04-08 PROCEDURE — 99214 OFFICE O/P EST MOD 30 MIN: CPT | Mod: 25 | Performed by: INTERNAL MEDICINE

## 2024-04-08 RX ORDER — RESPIRATORY SYNCYTIAL VIRUS VACCINE 120MCG/0.5
0.5 KIT INTRAMUSCULAR ONCE
Qty: 1 EACH | Refills: 0 | Status: CANCELLED | OUTPATIENT
Start: 2024-04-08 | End: 2024-04-08

## 2024-04-08 RX ORDER — SIMVASTATIN 80 MG
80 TABLET ORAL DAILY
Qty: 90 TABLET | Refills: 3 | Status: SHIPPED | OUTPATIENT
Start: 2024-04-08

## 2024-04-08 NOTE — PROGRESS NOTES
Preventive Care Visit  [facility]  Errol Henderson MD, Internal Medicine  Apr 8, 2024      Assessment & Plan     Encounter for annual wellness exam in Medicare patient      Need for vaccination against respiratory syncytial virus      Mixed hyperlipidemia  On simvastatin  - Comprehensive metabolic panel; Future  - Lipid panel reflex to direct LDL Fasting; Future    History of Graves' disease  US thyroid 11/2022 - 2 benign left nodules  - TSH with free T4 reflex; Future    Anxiety  Stable on Paxil  - CBC with platelets; Future  - Comprehensive metabolic panel; Future    Osteopenia of multiple sites  She had DXA in 4/2023, low fracture risk  - Vitamin D Deficiency; Future    Screening for endocrine, metabolic and immunity disorder      Visit for screening mammogram    - MA Screen Bilateral w/Venkata; Future    Special screening for malignant neoplasms, colon    - Colonoscopy Screening  Referral; Future  - Hemoglobin A1c; Future  - UA Macroscopic with reflex to Microscopic and Culture; Future    Abnormal finding of blood chemistry, unspecified    - Hemoglobin A1c; Future    Sjogren's syndrome, with unspecified organ involvement (H24)  Stable, no significant symptoms.    Acute left-sided low back pain with left-sided sciatica  Over the last 3 years, on and off, has pain in the lateral side of the left ankle and knee when is laying on the left side. Sometimes pain in the left lateral hip when walking on longer distance. No leg weakness, numbness, foot drop, incontinence.  Exam is normal, negative straight leg test, gait normal, DTRs normal  No previous injuries and surgeries.  She will take NSAIDs OTC scheduled for few days, then as needed.  We will do the xray today.  She will start PT.    - XR Lumbar Spine 2/3 Views; Future  - Physical Therapy  Referral; Future    Hematuria  Twice over the last few months, she noticed pink color of her urine. It lasted for 1-2 days and resolved by  "itself. No symptoms of infection, vaginal discharge. UA will be done today. We discussed referral to Urology.            BMI  Estimated body mass index is 25.21 kg/m  as calculated from the following:    Height as of this encounter: 1.638 m (5' 4.5\").    Weight as of this encounter: 67.7 kg (149 lb 3.2 oz).       Counseling  Appropriate preventive services were discussed with this patient, including applicable screening as appropriate for fall prevention, nutrition, physical activity, Tobacco-use cessation, weight loss and cognition.  Checklist reviewing preventive services available has been given to the patient.  Reviewed patient's diet, addressing concerns and/or questions.   Patient reported safety concerns were addressed today.Information on urinary incontinence and treatment options given to patient.           Urban Del Valle is a 74 year old, presenting for the following:  Physical (AWV), Musculoskeletal Problem (Chronic pain, worsening/more frequent, mainly at night), and URI (Started Thursday with ST, cough, fatigue. Covid test at home negative Saturday)        4/8/2024     7:38 AM   Additional Questions   Roomed by          Via the Health Maintenance questionnaire, the patient has reported the following services have been completed -Mammogram, this information has been sent to the abstraction team.  Health Care Directive  Patient does not have a Health Care Directive or Living Will: Patient states has Advance Directive and will bring in a copy to clinic.    HPI              4/7/2024   General Health   How would you rate your overall physical health? Excellent   Feel stress (tense, anxious, or unable to sleep) To some extent   (!) STRESS CONCERN      4/7/2024   Nutrition   Diet: Low fat/cholesterol         4/7/2024   Exercise   Days per week of moderate/strenous exercise 6 days   Average minutes spent exercising at this level 30 min         4/7/2024   Social Factors   Frequency of gathering with friends " or relatives Once a week   Worry food won't last until get money to buy more No   Food not last or not have enough money for food? No   Do you have housing?  Yes   Are you worried about losing your housing? No   Lack of transportation? No   Unable to get utilities (heat,electricity)? No         4/8/2024   Fall Risk   Gait Speed Test (Document in seconds) 3   Gait Speed Test Interpretation Less than or equal to 5.00 seconds - PASS          4/7/2024   Activities of Daily Living- Home Safety   Needs help with the following daily activites None of the above   Safety concerns in the home No grab bars in the bathroom         4/7/2024   Dental   Dentist two times every year? Yes         4/7/2024   Hearing Screening   Hearing concerns? None of the above         4/7/2024   Driving Risk Screening   Patient/family members have concerns about driving No         4/7/2024   General Alertness/Fatigue Screening   Have you been more tired than usual lately? No         4/7/2024   Urinary Incontinence Screening   Bothered by leaking urine in past 6 months Yes         3/27/2024   TB Screening   Were you born outside of the [place]? No         Today's PHQ-2 Score:       4/7/2024     6:19 PM   PHQ-2 ( 1999 Pfizer)   Q1: Little interest or pleasure in doing things 0   Q2: Feeling down, depressed or hopeless 0   PHQ-2 Score 0   Q1: Little interest or pleasure in doing things Not at all   Q2: Feeling down, depressed or hopeless Not at all   PHQ-2 Score 0           4/7/2024   Substance Use   Alcohol more than 3/day or more than 7/wk No   Do you have a current opioid prescription? No   How severe/bad is pain from 1 to 10? 2/10   Do you use any other substances recreationally? No     Social History     Tobacco Use    Smoking status: Never     Passive exposure: Never    Smokeless tobacco: Never   Vaping Use    Vaping Use: Never used   Substance Use Topics    Alcohol use: Yes     Alcohol/week: 3.0 standard drinks of alcohol    Drug use: No            4/24/2023   LAST FHS-7 RESULTS   1st degree relative breast or ovarian cancer No   Any relative bilateral breast cancer No   Any male have breast cancer No   Any ONE woman have BOTH breast AND ovarian cancer No   Any woman with breast cancer before 50yrs No   2 or more relatives with breast AND/OR ovarian cancer No   2 or more relatives with breast AND/OR bowel cancer Yes            ASCVD Risk   The 10-year ASCVD risk score (Alma HALL, et al., 2019) is: 9.6%    Values used to calculate the score:      Age: 74 years      Sex: Female      Is Non- : No      Diabetic: No      Tobacco smoker: No      Systolic Blood Pressure: 116 mmHg      Is BP treated: No      HDL Cholesterol: 80 mg/dL      Total Cholesterol: 224 mg/dL            Reviewed and updated as needed this visit by Provider                      Current providers sharing in care for this patient include:  Patient Care Team:  Errol Henderson MD as PCP - General (Internal Medicine)  Christiane Montero MD as MD (INTERNAL MEDICINE - ENDOCRINOLOGY, DIABETES & METABOLISM)  Errol Henderson MD as Assigned PCP    The following health maintenance items are reviewed in Epic and correct as of today:  Health Maintenance   Topic Date Due    RSV VACCINE (Pregnancy & 60+) (1 - 1-dose 60+ series) Never done    INFLUENZA VACCINE (1) 09/01/2023    COVID-19 Vaccine (6 - 2023-24 season) 09/01/2023    MEDICARE ANNUAL WELLNESS VISIT  02/08/2024    LIPID  02/08/2024    ANNUAL REVIEW OF HM ORDERS  02/08/2024    MAMMO SCREENING  04/24/2024    COLORECTAL CANCER SCREENING  08/23/2024    FALL RISK ASSESSMENT  04/08/2025    GLUCOSE  02/08/2026    ADVANCE CARE PLANNING  02/08/2028    DTAP/TDAP/TD IMMUNIZATION (4 - Td or Tdap) 09/04/2028    DEXA  04/17/2038    HEPATITIS C SCREENING  Completed    PHQ-2 (once per calendar year)  Completed    Pneumococcal Vaccine: 65+ Years  Completed    ZOSTER IMMUNIZATION  Completed    IPV IMMUNIZATION  Aged Out    HPV  "IMMUNIZATION  Aged Out    MENINGITIS IMMUNIZATION  Aged Out    RSV MONOCLONAL ANTIBODY  Aged Out            Objective    Exam  /70 (BP Location: Right arm, Patient Position: Sitting, Cuff Size: Adult Regular)   Pulse 91   Temp 98.9  F (37.2  C) (Oral)   Resp 16   Ht 1.638 m (5' 4.5\")   Wt 67.7 kg (149 lb 3.2 oz)   LMP  (LMP Unknown)   SpO2 97%   Breastfeeding No   BMI 25.21 kg/m     Estimated body mass index is 25.21 kg/m  as calculated from the following:    Height as of this encounter: 1.638 m (5' 4.5\").    Weight as of this encounter: 67.7 kg (149 lb 3.2 oz).    Physical Exam  General Appearance: Alert, cooperative, no distress, appears stated age.  Head: Normocephalic, without obvious abnormality, atraumatic  Eyes: PERRL, conjunctiva/corneas clear, EOM's intact  Ears: Normal TM's and external ear canals, both ears  Nose: Nares normal, septum midline,mucosa normal, no drainage  Throat: Lips, mucosa, and tongue normal; teeth and gums normal  Neck: Supple, symmetrical, trachea midline, no adenopathy;  thyroid: not enlarged, symmetric, no tenderness/mass/nodules; no carotid bruit or JVD  Back: Symmetric, no curvature, ROM normal, no CVA tenderness.  Lungs: Clear to auscultation bilaterally, respirations unlabored.  Breasts: No breast masses, tenderness, asymmetry, or nipple discharge.  Heart: Regular rate and rhythm, S1 and S2 normal, no murmur, rub, or gallop.  Abdomen: Soft, non-tender, bowel sounds active all four quadrants,  no masses, no organomegaly.  Extremities: Extremities normal, atraumatic, no cyanosis or edema.  Skin: Skin color, texture, turgor normal, no rashes or lesions.  Lymph nodes: Cervical, supraclavicular, and axillary nodes normal.  Neurologic: No focal neurological findings.          4/8/2024   Mini Cog   Clock Draw Score 2 Normal   3 Item Recall 3 objects recalled   Mini Cog Total Score 5              Signed Electronically by: Errol Henderson MD    "

## 2024-04-08 NOTE — PATIENT INSTRUCTIONS
Due for colonoscopy in August.     To schedule mammogram after April. [phone]    DXA scan in 4/2025.    Labs today. Xray today.    PT to schedule.

## 2024-04-15 ENCOUNTER — TRANSFERRED RECORDS (OUTPATIENT)
Dept: HEALTH INFORMATION MANAGEMENT | Facility: CLINIC | Age: 74
End: 2024-04-15
Payer: COMMERCIAL

## 2024-05-07 DIAGNOSIS — F41.9 ANXIETY: ICD-10-CM

## 2024-05-07 RX ORDER — PAROXETINE 20 MG/1
TABLET, FILM COATED ORAL
Qty: 90 TABLET | Refills: 1 | Status: SHIPPED | OUTPATIENT
Start: 2024-05-07

## 2024-05-09 ENCOUNTER — TRANSFERRED RECORDS (OUTPATIENT)
Dept: HEALTH INFORMATION MANAGEMENT | Facility: CLINIC | Age: 74
End: 2024-05-09
Payer: COMMERCIAL

## 2024-05-10 ENCOUNTER — HOSPITAL ENCOUNTER (OUTPATIENT)
Dept: MAMMOGRAPHY | Facility: CLINIC | Age: 74
Discharge: HOME | End: 2024-05-10
Attending: INTERNAL MEDICINE | Admitting: INTERNAL MEDICINE
Payer: MEDICARE

## 2024-05-10 DIAGNOSIS — Z12.31 VISIT FOR SCREENING MAMMOGRAM: ICD-10-CM

## 2024-05-10 PROCEDURE — 77063 BREAST TOMOSYNTHESIS BI: CPT

## 2024-06-20 ENCOUNTER — TRANSFERRED RECORDS (OUTPATIENT)
Dept: HEALTH INFORMATION MANAGEMENT | Facility: CLINIC | Age: 74
End: 2024-06-20
Payer: COMMERCIAL

## 2024-08-09 DIAGNOSIS — E78.00 HYPERCHOLESTEROLEMIA: ICD-10-CM

## 2024-08-09 RX ORDER — SIMVASTATIN 40 MG
TABLET ORAL
Qty: 90 TABLET | Refills: 3 | OUTPATIENT
Start: 2024-08-09

## 2024-08-11 ENCOUNTER — MYC MEDICAL ADVICE (OUTPATIENT)
Dept: INTERNAL MEDICINE | Facility: CLINIC | Age: 74
End: 2024-08-11
Payer: COMMERCIAL

## 2024-08-11 ENCOUNTER — NURSE TRIAGE (OUTPATIENT)
Dept: INTERNAL MEDICINE | Facility: CLINIC | Age: 74
End: 2024-08-11
Payer: COMMERCIAL

## 2024-08-11 NOTE — TELEPHONE ENCOUNTER
COVID Positive/Requesting COVID treatment    Patient is positive for COVID and requesting treatment options.    Date of positive COVID test (PCR or at home)? 8/10/2024  Current COVID symptoms: cough, fatigue, and headache  Date COVID symptoms began: 8/9/2024    Message should be routed to clinic RN pool. Best phone number to use for call back:  [phone]

## 2024-08-12 ENCOUNTER — VIRTUAL VISIT (OUTPATIENT)
Dept: INTERNAL MEDICINE | Facility: CLINIC | Age: 74
End: 2024-08-12
Payer: COMMERCIAL

## 2024-08-12 DIAGNOSIS — U07.1 INFECTION DUE TO 2019 NOVEL CORONAVIRUS: Primary | ICD-10-CM

## 2024-08-12 PROCEDURE — 99213 OFFICE O/P EST LOW 20 MIN: CPT | Mod: 95 | Performed by: INTERNAL MEDICINE

## 2024-08-12 NOTE — PATIENT INSTRUCTIONS
In addition to starting Paxlovid, you can use over-the-counter Mucinex, Delsym cough syrup and Tylenol/acetaminophen to manage her symptoms    Quarantine for a total of 10 days from onset of symptoms    Monitor your oxygen saturation levels and contact our office if you are finding results below 94%

## 2024-08-12 NOTE — PROGRESS NOTES
"Ivon is a 74 year old who is being evaluated via a billable video visit.    How would you like to obtain your AVS? MyChart  If the video visit is dropped, the invitation should be resent by: Text to cell phone: [phone]  Will anyone else be joining your video visit? No      Assessment & Plan     Infection due to 2019 novel coronavirus  74-year-old woman who has tested positive for COVID.  Symptoms began on Friday, August 9 with sore throat.  Now having cough with sinus congestion, headache and fatigue.  No fevers or chills.  Slight shortness of breath.  No history of asthma or COPD.  She has at home oxygen monitor and will start checking her O2 sat level and let us know if anything below 94%.  She is a good candidate for Paxlovid and I am recommending starting.  She will hold simvastatin for 7 days after starting the Paxlovid.  We discussed potential side effects.  We also discussed quarantine of 10 days  - nirmatrelvir and ritonavir (PAXLOVID) 300 mg/100 mg therapy pack; Take 3 tablets by mouth 2 times daily for 5 days Hold simvastatin for 1 week after starting Paxlovid          BMI  Estimated body mass index is 25.21 kg/m  as calculated from the following:    Height as of 4/8/24: 1.638 m (5' 4.5\").    Weight as of 4/8/24: 67.7 kg (149 lb 3.2 oz).         See Patient Instructions    Subjective   Ivon is a 74 year old, presenting for the following health issues: Video visit completed today to address new diagnosis of COVID.  See assessment and plan for details  covid treatment  (Tested positive 8/10/24, symptoms started 8/9/24,today has cough, congestion, fatigue, loss of appetite, foggy, achy, headache. no fever )      Video Start Time:  11:42 AM          Review of Systems  Constitutional, HEENT, cardiovascular, pulmonary, gi and gu systems are negative, except as otherwise noted.      Objective           Vitals:  No vitals were obtained today due to virtual visit.    Physical Exam       Well-appearing elderly " woman who does not appear to be having any respiratory difficulty.      Video-Visit Details    Type of service:  Video Visit   Video End Time: 11:55 AM  Originating Location (pt. Location): Home    Distant Location (provider location):  On-site  Platform used for Video Visit: Reema  Signed Electronically by: Leonid Arteaga MD

## 2024-08-12 NOTE — TELEPHONE ENCOUNTER
Nurse Triage SBAR    Is this a 2nd Level Triage? YES, LICENSED PRACTITIONER REVIEW IS REQUIRED    Situation: Patient calling in with positive COVID-19 test on 8/10/24, symptoms started 8/9/24. Wondering if she should take Paxlovid.     Background: Was traveling home from [place] on Thursday. Forgot to wear a mask.     Assessment: Symptoms started Friday evening with sore throat, chills, body aches, fatigue, headache, congestion, cough. Has been taking OTC Dayquil and ibuprofen.     Protocol Recommended Disposition:   Discuss With PCP And Callback By Nurse Within 1 Hour    Recommendation: Gave recommended home care advice. Pt states she does not want to take Paxlovid if she doesn't have to but if provider recommends it then she will take it. Symptoms have been managed with OTC medication so far.     Routed to provider    Does the patient meet one of the following criteria for ADS visit consideration? 16+ years old, with an [facility] PCP     TIP  Providers, please consider if this condition is appropriate for management at one of our Acute and Diagnostic Services sites.     If patient is a good candidate, please use dotphrase <dot>triageresponse and select Refer to ADS to document.    Reason for Disposition   HIGH RISK patient (e.g., weak immune system, age > 64 years, obesity with BMI of 30 or higher, pregnant, chronic lung disease or other chronic medical condition) and COVID symptoms (e.g., cough, fever)  (Exceptions: Already seen by doctor or NP/PA and no new or worsening symptoms.)    Additional Information   Negative: SEVERE difficulty breathing (e.g., struggling for each breath, speaks in single words)   Negative: Difficult to awaken or acting confused (e.g., disoriented, slurred speech)   Negative: Bluish (or gray) lips or face now   Negative: Shock suspected (e.g., cold/pale/clammy skin, too weak to stand, low BP, rapid pulse)   Negative: Sounds like a life-threatening emergency to the triager   Negative:  Diagnosed or suspected COVID-19 and symptoms lasting 3 or more weeks   Negative: COVID-19 exposure and no symptoms   Negative: COVID-19 vaccine reaction suspected (e.g., fever, headache, muscle aches) occurring 1 to 3 days after getting vaccine   Negative: COVID-19 vaccine, questions about   Negative: Lives with someone known to have influenza (flu test positive) and flu-like symptoms (e.g., cough, runny nose, sore throat, SOB; with or without fever)   Negative: Possible COVID-19 symptoms and triager concerned about severity of symptoms or other causes   Negative: COVID-19 and breastfeeding, questions about   Negative: SEVERE or constant chest pain or pressure  (Exception: Mild central chest pain, present only when coughing.)   Negative: MODERATE difficulty breathing (e.g., speaks in phrases, SOB even at rest, pulse 100-120)   Negative: Headache and stiff neck (can't touch chin to chest)   Negative: Oxygen level (e.g., pulse oximetry) 90% or lower   Negative: Chest pain or pressure  (Exception: MILD central chest pain, present only when coughing.)   Negative: Drinking very little and dehydration suspected (e.g., no urine > 12 hours, very dry mouth, very lightheaded)   Negative: Patient sounds very sick or weak to the triager   Negative: MILD difficulty breathing (e.g., minimal/no SOB at rest, SOB with walking, pulse <100)   Negative: Fever > 103 F (39.4 C)   Negative: Fever > 101 F (38.3 C) and over 60 years of age   Negative: Fever > 100.0 F (37.8 C) and bedridden (e.g., CVA, chronic illness, recovering from surgery)    Protocols used: Coronavirus (COVID-19) Diagnosed or [id number]

## 2024-09-23 ENCOUNTER — TRANSFERRED RECORDS (OUTPATIENT)
Dept: HEALTH INFORMATION MANAGEMENT | Facility: CLINIC | Age: 74
End: 2024-09-23
Payer: COMMERCIAL

## 2024-11-07 DIAGNOSIS — F41.9 ANXIETY: ICD-10-CM

## 2024-11-07 RX ORDER — PAROXETINE 20 MG/1
TABLET, FILM COATED ORAL
Qty: 90 TABLET | Refills: 3 | Status: SHIPPED | OUTPATIENT
Start: 2024-11-07

## 2025-04-06 SDOH — HEALTH STABILITY: PHYSICAL HEALTH: ON AVERAGE, HOW MANY DAYS PER WEEK DO YOU ENGAGE IN MODERATE TO STRENUOUS EXERCISE (LIKE A BRISK WALK)?: 7 DAYS

## 2025-04-06 SDOH — HEALTH STABILITY: PHYSICAL HEALTH: ON AVERAGE, HOW MANY MINUTES DO YOU ENGAGE IN EXERCISE AT THIS LEVEL?: 40 MIN

## 2025-04-06 ASSESSMENT — SOCIAL DETERMINANTS OF HEALTH (SDOH): HOW OFTEN DO YOU GET TOGETHER WITH FRIENDS OR RELATIVES?: ONCE A WEEK

## 2025-04-09 ENCOUNTER — OFFICE VISIT (OUTPATIENT)
Dept: INTERNAL MEDICINE | Facility: CLINIC | Age: 75
End: 2025-04-09
Payer: COMMERCIAL

## 2025-04-09 VITALS
OXYGEN SATURATION: 98 % | HEART RATE: 70 BPM | HEIGHT: 65 IN | SYSTOLIC BLOOD PRESSURE: 98 MMHG | DIASTOLIC BLOOD PRESSURE: 64 MMHG | RESPIRATION RATE: 16 BRPM | TEMPERATURE: 97 F | BODY MASS INDEX: 23.88 KG/M2 | WEIGHT: 143.3 LBS

## 2025-04-09 DIAGNOSIS — M85.89 OSTEOPENIA OF MULTIPLE SITES: ICD-10-CM

## 2025-04-09 DIAGNOSIS — Z13.1 SCREENING FOR DIABETES MELLITUS: ICD-10-CM

## 2025-04-09 DIAGNOSIS — M35.00 SJOGREN'S SYNDROME, WITH UNSPECIFIED ORGAN INVOLVEMENT: ICD-10-CM

## 2025-04-09 DIAGNOSIS — Z86.39 HISTORY OF GRAVES' DISEASE: ICD-10-CM

## 2025-04-09 DIAGNOSIS — E78.2 MIXED HYPERLIPIDEMIA: ICD-10-CM

## 2025-04-09 DIAGNOSIS — Z12.31 VISIT FOR SCREENING MAMMOGRAM: ICD-10-CM

## 2025-04-09 DIAGNOSIS — F41.9 ANXIETY: ICD-10-CM

## 2025-04-09 DIAGNOSIS — Z13.89 SCREENING FOR BLOOD OR PROTEIN IN URINE: ICD-10-CM

## 2025-04-09 DIAGNOSIS — Z12.11 SCREEN FOR COLON CANCER: ICD-10-CM

## 2025-04-09 DIAGNOSIS — Z00.00 ENCOUNTER FOR ANNUAL WELLNESS VISIT (AWV) IN MEDICARE PATIENT: Primary | ICD-10-CM

## 2025-04-09 LAB
ALBUMIN SERPL BCG-MCNC: 4.4 G/DL (ref 3.5–5.2)
ALBUMIN UR-MCNC: NEGATIVE MG/DL
ALP SERPL-CCNC: 79 U/L (ref 40–150)
ALT SERPL W P-5'-P-CCNC: 14 U/L (ref 0–50)
ANION GAP SERPL CALCULATED.3IONS-SCNC: 10 MMOL/L (ref 7–15)
APPEARANCE UR: CLEAR
AST SERPL W P-5'-P-CCNC: 22 U/L (ref 0–45)
BILIRUB SERPL-MCNC: 0.5 MG/DL
BILIRUB UR QL STRIP: NEGATIVE
BUN SERPL-MCNC: 15.7 MG/DL (ref 8–23)
CALCIUM SERPL-MCNC: 10.2 MG/DL (ref 8.8–10.4)
CHLORIDE SERPL-SCNC: 104 MMOL/L (ref 98–107)
CHOLEST SERPL-MCNC: 205 MG/DL
COLOR UR AUTO: YELLOW
CREAT SERPL-MCNC: 0.89 MG/DL (ref 0.51–0.95)
EGFRCR SERPLBLD CKD-EPI 2021: 68 ML/MIN/1.73M2
ERYTHROCYTE [DISTWIDTH] IN BLOOD BY AUTOMATED COUNT: 12.7 % (ref 10–15)
EST. AVERAGE GLUCOSE BLD GHB EST-MCNC: 105 MG/DL
FASTING STATUS PATIENT QL REPORTED: YES
FASTING STATUS PATIENT QL REPORTED: YES
GLUCOSE SERPL-MCNC: 102 MG/DL (ref 70–99)
GLUCOSE UR STRIP-MCNC: NEGATIVE MG/DL
HBA1C MFR BLD: 5.3 % (ref 0–5.6)
HCO3 SERPL-SCNC: 28 MMOL/L (ref 22–29)
HCT VFR BLD AUTO: 38.6 % (ref 35–47)
HDLC SERPL-MCNC: 88 MG/DL
HGB BLD-MCNC: 12.9 G/DL (ref 11.7–15.7)
HGB UR QL STRIP: ABNORMAL
KETONES UR STRIP-MCNC: NEGATIVE MG/DL
LDLC SERPL CALC-MCNC: 98 MG/DL
LEUKOCYTE ESTERASE UR QL STRIP: ABNORMAL
MCH RBC QN AUTO: 31.1 PG (ref 26.5–33)
MCHC RBC AUTO-ENTMCNC: 33.4 G/DL (ref 31.5–36.5)
MCV RBC AUTO: 93 FL (ref 78–100)
NITRATE UR QL: NEGATIVE
NONHDLC SERPL-MCNC: 117 MG/DL
PH UR STRIP: 7.5 [PH] (ref 5–8)
PLATELET # BLD AUTO: 174 10E3/UL (ref 150–450)
POTASSIUM SERPL-SCNC: 4.2 MMOL/L (ref 3.4–5.3)
PROT SERPL-MCNC: 7.2 G/DL (ref 6.4–8.3)
RBC # BLD AUTO: 4.15 10E6/UL (ref 3.8–5.2)
RBC #/AREA URNS AUTO: ABNORMAL /HPF
SODIUM SERPL-SCNC: 142 MMOL/L (ref 135–145)
SP GR UR STRIP: 1.01 (ref 1–1.03)
SQUAMOUS #/AREA URNS AUTO: ABNORMAL /LPF
T4 FREE SERPL-MCNC: 1.05 NG/DL (ref 0.9–1.7)
TRIGL SERPL-MCNC: 93 MG/DL
TSH SERPL DL<=0.005 MIU/L-ACNC: 4.42 UIU/ML (ref 0.3–4.2)
UROBILINOGEN UR STRIP-ACNC: 0.2 E.U./DL
VIT D+METAB SERPL-MCNC: 63 NG/ML (ref 20–50)
WBC # BLD AUTO: 4.4 10E3/UL (ref 4–11)
WBC #/AREA URNS AUTO: ABNORMAL /HPF

## 2025-04-09 PROCEDURE — 85027 COMPLETE CBC AUTOMATED: CPT | Performed by: INTERNAL MEDICINE

## 2025-04-09 PROCEDURE — 81001 URINALYSIS AUTO W/SCOPE: CPT | Performed by: INTERNAL MEDICINE

## 2025-04-09 PROCEDURE — 84443 ASSAY THYROID STIM HORMONE: CPT | Performed by: INTERNAL MEDICINE

## 2025-04-09 PROCEDURE — 82306 VITAMIN D 25 HYDROXY: CPT | Performed by: INTERNAL MEDICINE

## 2025-04-09 PROCEDURE — 84439 ASSAY OF FREE THYROXINE: CPT | Performed by: INTERNAL MEDICINE

## 2025-04-09 PROCEDURE — 36415 COLL VENOUS BLD VENIPUNCTURE: CPT | Performed by: INTERNAL MEDICINE

## 2025-04-09 PROCEDURE — 80053 COMPREHEN METABOLIC PANEL: CPT | Performed by: INTERNAL MEDICINE

## 2025-04-09 PROCEDURE — 80061 LIPID PANEL: CPT | Performed by: INTERNAL MEDICINE

## 2025-04-09 PROCEDURE — 83036 HEMOGLOBIN GLYCOSYLATED A1C: CPT | Performed by: INTERNAL MEDICINE

## 2025-04-09 NOTE — PATIENT INSTRUCTIONS
Labs today.    You can schedule mammogram and DXA scan [phone].    You are due for colonoscopy.

## 2025-04-09 NOTE — PROGRESS NOTES
Preventive Care Visit  [facility]  Errol Henderson MD, Internal Medicine  Apr 9, 2025      Assessment & Plan     Encounter for annual wellness visit (AWV) in Medicare patient      Screen for colon cancer    - Colonoscopy Screening  Referral; Future    Mixed hyperlipidemia  On simvastatin 80 mg, dose was increased last year when LDL was above 100. She noticed worsening constipation. We discussed switch to a different statin when today's results available.  - CBC with platelets; Future  - Comprehensive metabolic panel; Future  - Lipid panel reflex to direct LDL Fasting; Future    Anxiety  Stable on Paxil  - TSH with free T4 reflex; Future    Osteopenia of multiple sites  She had DXA in 4/2023, low fracture risk   - DX Bone Density; Future  - Vitamin D Deficiency; Future    History of Graves' disease  US thyroid 11/2022 - 2 benign left nodules   - CBC with platelets; Future  - TSH with free T4 reflex; Future    Sjogren's syndrome, with unspecified organ involvement  Stable, no significant symptoms.     Visit for screening mammogram    - MA Screen Bilateral w/Venkata; Future    Screening for diabetes mellitus    - Hemoglobin A1c; Future    Screening for blood or protein in urine    - UA Macroscopic with reflex to Microscopic and Culture; Future            Counseling  Appropriate preventive services were addressed with this patient via screening, questionnaire, or discussion as appropriate for fall prevention, nutrition, physical activity, Tobacco-use cessation, social engagement, weight loss and cognition.  Checklist reviewing preventive services available has been given to the patient.  Reviewed patient's diet, addressing concerns and/or questions.   She is at risk for psychosocial distress and has been provided with information to reduce risk.   Patient reported safety concerns were addressed today.Information on urinary incontinence and treatment options given to patient.            Urban Del Valle is a 75 year old, presenting for the following:  Wellness Visit (Mammo  05/10/24 DXA 04/17/23 Colon 8/23/19 )        4/9/2025     8:34 AM   Additional Questions   Roomed by Deanna QUEZADA           Advance Care Planning  Patient does not have a Health Care Directive: Discussed advance care planning with patient; however, patient declined at this time.      4/6/2025   General Health   How would you rate your overall physical health? Excellent   Feel stress (tense, anxious, or unable to sleep) To some extent   (!) STRESS CONCERN      4/6/2025   Nutrition   Diet: Low fat/cholesterol         4/6/2025   Exercise   Days per week of moderate/strenous exercise 7 days   Average minutes spent exercising at this level 40 min         4/6/2025   Social Factors   Frequency of gathering with friends or relatives Once a week   Worry food won't last until get money to buy more No   Food not last or not have enough money for food? No   Do you have housing? (Housing is defined as stable permanent housing and does not include staying ouside in a car, in a tent, in an abandoned building, in an overnight shelter, or couch-surfing.) Yes   Are you worried about losing your housing? No   Lack of transportation? No   Unable to get utilities (heat,electricity)? No         4/9/2025   Fall Risk   Fallen 2 or more times in the past year? No   Trouble with walking or balance? No          4/6/2025   Activities of Daily Living- Home Safety   Needs help with the following daily activites None of the above   Safety concerns in the home No grab bars in the bathroom         4/6/2025   Dental   Dentist two times every year? Yes         4/6/2025   Hearing Screening   Hearing concerns? None of the above         4/6/2025   Driving Risk Screening   Patient/family members have concerns about driving No         4/6/2025   General Alertness/Fatigue Screening   Have you been more tired than usual lately? No         4/6/2025    Urinary Incontinence Screening   Bothered by leaking urine in past 6 months Yes           3/27/2024   TB Screening   Were you born outside of the [place]? No           Today's PHQ-2 Score:       4/9/2025     8:41 AM   PHQ-2 ( 1999 Pf[employer])   Q1: Little interest or pleasure in doing things 0   Q2: Feeling down, depressed or hopeless 0   PHQ-2 Score 0           4/6/2025   Substance Use   Alcohol more than 3/day or more than 7/wk No   Do you have a current opioid prescription? No   How severe/bad is pain from 1 to 10? 1/10   Do you use any other substances recreationally? No     Social History     Tobacco Use    Smoking status: Never     Passive exposure: Never    Smokeless tobacco: Never   Vaping Use    Vaping status: Never Used   Substance Use Topics    Alcohol use: Yes     Alcohol/week: 3.0 standard drinks of alcohol    Drug use: No           4/24/2023   LAST FHS-7 RESULTS   1st degree relative breast or ovarian cancer No   Any relative bilateral breast cancer No   Any male have breast cancer No   Any ONE woman have BOTH breast AND ovarian cancer No   Any woman with breast cancer before 50yrs No   2 or more relatives with breast AND/OR ovarian cancer No   2 or more relatives with breast AND/OR bowel cancer Yes            ASCVD Risk   The 10-year ASCVD risk score (Alma HALL, et al., 2019) is: 7.9%    Values used to calculate the score:      Age: 75 years      Sex: Female      Is Non- : No      Diabetic: No      Tobacco smoker: No      Systolic Blood Pressure: 98 mmHg      Is BP treated: No      HDL Cholesterol: 80 mg/dL      Total Cholesterol: 218 mg/dL            Reviewed and updated as needed this visit by Provider                      Current providers sharing in care for this patient include:  Patient Care Team:  Errol Henderson MD as PCP - General (Internal Medicine)  Christiane Montero MD as MD (INTERNAL MEDICINE - ENDOCRINOLOGY, DIABETES & METABOLISM)  Errol Henderson MD as  "Assigned PCP    The following health maintenance items are reviewed in Epic and correct as of today:  Health Maintenance   Topic Date Due    ANNUAL REVIEW OF HM ORDERS  02/08/2024    COLORECTAL CANCER SCREENING  08/23/2024    INFLUENZA VACCINE (1) 09/01/2024    COVID-19 Vaccine (7 - 2024-25 season) 09/01/2024    LIPID  04/08/2025    MEDICARE ANNUAL WELLNESS VISIT  04/08/2025    MAMMO SCREENING  05/10/2025    FALL RISK ASSESSMENT  04/09/2026    DIABETES SCREENING  04/08/2027    DTAP/TDAP/TD IMMUNIZATION (4 - Td or Tdap) 09/04/2028    ADVANCE CARE PLANNING  04/08/2029    DEXA  04/17/2038    HEPATITIS C SCREENING  Completed    PHQ-2 (once per calendar year)  Completed    Pneumococcal Vaccine: 50+ Years  Completed    ZOSTER IMMUNIZATION  Completed    RSV VACCINE  Completed    HPV IMMUNIZATION  Aged Out    MENINGITIS IMMUNIZATION  Aged Out            Objective    Exam  BP 98/64   Pulse 70   Temp 97  F (36.1  C) (Temporal)   Resp 16   Ht 1.651 m (5' 5\")   Wt 65 kg (143 lb 4.8 oz)   LMP  (LMP Unknown)   SpO2 98%   BMI 23.85 kg/m     Estimated body mass index is 23.85 kg/m  as calculated from the following:    Height as of this encounter: 1.651 m (5' 5\").    Weight as of this encounter: 65 kg (143 lb 4.8 oz).    Physical Exam  General Appearance: Alert, cooperative, no distress, appears stated age.  Head: Normocephalic, without obvious abnormality, atraumatic  Eyes: PERRL, conjunctiva/corneas clear, EOM's intact  Ears: Normal TM's and external ear canals, both ears  Nose: Nares normal, septum midline,mucosa normal, no drainage  Throat: Lips, mucosa, and tongue normal; teeth and gums normal  Neck: Supple, symmetrical, trachea midline, no adenopathy;  thyroid: not enlarged, symmetric, no tenderness/mass/nodules; no carotid bruit or JVD  Back: Symmetric, no curvature, ROM normal, no CVA tenderness.  Lungs: Clear to auscultation bilaterally, respirations unlabored.  Breasts: No breast masses, tenderness, asymmetry, or " nipple discharge.  Heart: Regular rate and rhythm, S1 and S2 normal, no murmur, rub, or gallop.  Abdomen: Soft, non-tender, bowel sounds active all four quadrants,  no masses, no organomegaly.  Extremities: Extremities normal, atraumatic, no cyanosis or edema.  Skin: Skin color, texture, turgor normal, no rashes or lesions.  Lymph nodes: Cervical, supraclavicular, and axillary nodes normal.  Neurologic: No focal neurological findings.          4/9/2025   Mini Cog   Clock Draw Score 2 Normal   3 Item Recall 3 objects recalled   Mini Cog Total Score 5              Signed Electronically by: Errol Henderson MD

## 2025-04-10 ENCOUNTER — PATIENT OUTREACH (OUTPATIENT)
Dept: CARE COORDINATION | Facility: CLINIC | Age: 75
End: 2025-04-10
Payer: COMMERCIAL

## 2025-05-20 ENCOUNTER — HOSPITAL ENCOUNTER (OUTPATIENT)
Dept: MAMMOGRAPHY | Facility: CLINIC | Age: 75
Discharge: HOME | End: 2025-05-20
Attending: INTERNAL MEDICINE
Payer: MEDICARE

## 2025-05-20 DIAGNOSIS — Z12.31 VISIT FOR SCREENING MAMMOGRAM: ICD-10-CM

## 2025-05-20 PROCEDURE — 77063 BREAST TOMOSYNTHESIS BI: CPT

## 2025-06-05 ENCOUNTER — TRANSFERRED RECORDS (OUTPATIENT)
Dept: ADMINISTRATIVE | Facility: CLINIC | Age: 75
End: 2025-06-05
Payer: COMMERCIAL

## 2025-06-05 NOTE — PROCEDURES
[facility]   [address], [address], [place] [address]     Patient Name: Ivon Newell  Gender:   Female  Exam Date: 06/05/2025 Visit Number:   [id number]  Age: 75 Years 4 Months YOB: 1950  Attending MD: Heber Monahan MD Medical Record#:   [id number]  -----------------------------------------------------------------------------------------------------------------------------   Procedure: Colonoscopy   Indications: Family history of colon cancer in patient's 2 grandparents., high risk colorectal cancer screening   Family history of polyps in patient's mother.   Referring MD: Referral Self  Primary MD:      Errol Henderson MD   Medications: Admitting Medications:   0.9% Normal Saline at [facility]   Intra Procedure Medications:   Patient received monitored anesthesia care.     Complications: No immediate complications  ______________________________________________________________________________  Procedure:   An examination of the heart and lungs was performed and found to be within acceptable limits.  The patient was therefore deemed a reasonable candidate for endoscopy and monitored anesthesia care.   The risks, benefits and plan of the procedure were discussed with the patient and/or patient representative and all questions were answered.  After obtaining informed consent, the patient received monitored anesthesia care and I passed the scope   without difficulty via the rectum to the ileum.  The appendiceal orifice and ic valve were identified.  The scope was retroflexed during the examination  The quality of the prep was excellent  (Miralax/Gatorade/2 tablets Bisacodyl/Magnesium Citrate).    This was a complete examination throughout the entire colon.    Findings:    Normal finding.  Location - ileum.    The entire colon was normal.  Comments: The colon is very redundant.    Impression:  Family history of colon cancer   Family history of colon polyps, unspecified     Plan  Repeat  colonoscopy in 5 years.    We will attempt to contact you at appropriate intervals via [place] mail.  We may not be able to find you or contact you at that time, therefore you should know that the responsibility for following our recommendation rests with you.  If you don't hear from us at the time your procedure is due, please contact our office to schedule an appointment.  If your contact information should change, please contact our office so that we can update your records.        Electronically signed by:___________________  Heber Monahan MD                 06/05/2025    Medications:  Medication Dose Sig Description PRN Status PRN Reason Comments   aspirin 81 mg chewable tablet 81 mg chew 1 tablet by oral route  every day N     paroxetine 20 mg tablet 20 mg take 1 tablet by oral route  every day N  taking as directed   simvastatin 20 mg tablet 20 mg take 1 tablet by oral route  every day in the evening N  taking as directed     Allergies:  Medication Name Ingredient Reaction Comment    BACITRACIN      SULFA (SULFONAMIDE ANTIBIOTICS)      HUGO KHALIL       Vital Signs:  Date Time Systolic Diastolic Height Weight BMI   06/05/2025 0659  69 65 in 143.59 23.90   06/05/2025 750  62 .59 23.90   06/05/2025 801  73 .59 23.90     Race:  White  Ethnicity:  Not  or   Preferred Language:  English      cc: Errol Henderson MD        [facility] [phone]

## 2025-06-10 ENCOUNTER — TRANSFERRED RECORDS (OUTPATIENT)
Dept: HEALTH INFORMATION MANAGEMENT | Facility: CLINIC | Age: 75
End: 2025-06-10
Payer: COMMERCIAL

## 2025-07-26 DIAGNOSIS — E78.00 HYPERCHOLESTEROLEMIA: ICD-10-CM

## 2025-07-28 RX ORDER — SIMVASTATIN 80 MG
80 TABLET ORAL DAILY
Qty: 90 TABLET | Refills: 3 | Status: SHIPPED | OUTPATIENT
Start: 2025-07-28